# Patient Record
Sex: FEMALE | Race: WHITE | NOT HISPANIC OR LATINO | Employment: OTHER | URBAN - METROPOLITAN AREA
[De-identification: names, ages, dates, MRNs, and addresses within clinical notes are randomized per-mention and may not be internally consistent; named-entity substitution may affect disease eponyms.]

---

## 2017-04-12 ENCOUNTER — APPOINTMENT (OUTPATIENT)
Dept: LAB | Facility: OTHER | Age: 62
End: 2017-04-12
Payer: COMMERCIAL

## 2017-04-12 ENCOUNTER — TRANSCRIBE ORDERS (OUTPATIENT)
Dept: LAB | Facility: OTHER | Age: 62
End: 2017-04-12

## 2017-04-12 DIAGNOSIS — E03.9 HYPOTHYROIDISM: ICD-10-CM

## 2017-04-12 DIAGNOSIS — J45.30 MILD PERSISTENT ASTHMA, UNCOMPLICATED: ICD-10-CM

## 2017-04-12 DIAGNOSIS — E78.5 HYPERLIPIDEMIA: ICD-10-CM

## 2017-04-12 LAB
ALBUMIN SERPL BCP-MCNC: 3.3 G/DL (ref 3.5–5)
ALP SERPL-CCNC: 64 U/L (ref 46–116)
ALT SERPL W P-5'-P-CCNC: 33 U/L (ref 12–78)
ANION GAP SERPL CALCULATED.3IONS-SCNC: 9 MMOL/L (ref 4–13)
AST SERPL W P-5'-P-CCNC: 23 U/L (ref 5–45)
BASOPHILS # BLD AUTO: 0.02 THOUSANDS/ΜL (ref 0–0.1)
BASOPHILS NFR BLD AUTO: 0 % (ref 0–1)
BILIRUB SERPL-MCNC: 0.46 MG/DL (ref 0.2–1)
BUN SERPL-MCNC: 21 MG/DL (ref 5–25)
CALCIUM SERPL-MCNC: 8.3 MG/DL (ref 8.3–10.1)
CHLORIDE SERPL-SCNC: 106 MMOL/L (ref 100–108)
CHOLEST SERPL-MCNC: 185 MG/DL (ref 50–200)
CO2 SERPL-SCNC: 27 MMOL/L (ref 21–32)
CREAT SERPL-MCNC: 0.71 MG/DL (ref 0.6–1.3)
EOSINOPHIL # BLD AUTO: 0.01 THOUSAND/ΜL (ref 0–0.61)
EOSINOPHIL NFR BLD AUTO: 0 % (ref 0–6)
ERYTHROCYTE [DISTWIDTH] IN BLOOD BY AUTOMATED COUNT: 13.3 % (ref 11.6–15.1)
GFR SERPL CREATININE-BSD FRML MDRD: >60 ML/MIN/1.73SQ M
GLUCOSE P FAST SERPL-MCNC: 90 MG/DL (ref 65–99)
HCT VFR BLD AUTO: 40.3 % (ref 34.8–46.1)
HDLC SERPL-MCNC: 95 MG/DL (ref 40–60)
HGB BLD-MCNC: 13.2 G/DL (ref 11.5–15.4)
LDLC SERPL CALC-MCNC: 80 MG/DL (ref 0–100)
LYMPHOCYTES # BLD AUTO: 0.94 THOUSANDS/ΜL (ref 0.6–4.47)
LYMPHOCYTES NFR BLD AUTO: 20 % (ref 14–44)
MCH RBC QN AUTO: 32 PG (ref 26.8–34.3)
MCHC RBC AUTO-ENTMCNC: 32.8 G/DL (ref 31.4–37.4)
MCV RBC AUTO: 98 FL (ref 82–98)
MONOCYTES # BLD AUTO: 0.61 THOUSAND/ΜL (ref 0.17–1.22)
MONOCYTES NFR BLD AUTO: 13 % (ref 4–12)
NEUTROPHILS # BLD AUTO: 3.21 THOUSANDS/ΜL (ref 1.85–7.62)
NEUTS SEG NFR BLD AUTO: 67 % (ref 43–75)
NRBC BLD AUTO-RTO: 0 /100 WBCS
PLATELET # BLD AUTO: 243 THOUSANDS/UL (ref 149–390)
PMV BLD AUTO: 10 FL (ref 8.9–12.7)
POTASSIUM SERPL-SCNC: 3.3 MMOL/L (ref 3.5–5.3)
PROT SERPL-MCNC: 6.5 G/DL (ref 6.4–8.2)
RBC # BLD AUTO: 4.12 MILLION/UL (ref 3.81–5.12)
SODIUM SERPL-SCNC: 142 MMOL/L (ref 136–145)
TRIGL SERPL-MCNC: 48 MG/DL
TSH SERPL DL<=0.05 MIU/L-ACNC: 0.43 UIU/ML (ref 0.36–3.74)
WBC # BLD AUTO: 4.8 THOUSAND/UL (ref 4.31–10.16)

## 2017-04-12 PROCEDURE — 84443 ASSAY THYROID STIM HORMONE: CPT

## 2017-04-12 PROCEDURE — 85025 COMPLETE CBC W/AUTO DIFF WBC: CPT

## 2017-04-12 PROCEDURE — 80061 LIPID PANEL: CPT

## 2017-04-12 PROCEDURE — 36415 COLL VENOUS BLD VENIPUNCTURE: CPT

## 2017-04-12 PROCEDURE — 80053 COMPREHEN METABOLIC PANEL: CPT

## 2017-04-25 ENCOUNTER — ALLSCRIPTS OFFICE VISIT (OUTPATIENT)
Dept: OTHER | Facility: OTHER | Age: 62
End: 2017-04-25

## 2017-06-22 ENCOUNTER — ALLSCRIPTS OFFICE VISIT (OUTPATIENT)
Dept: OTHER | Facility: OTHER | Age: 62
End: 2017-06-22

## 2017-08-25 DIAGNOSIS — E03.9 HYPOTHYROIDISM: ICD-10-CM

## 2017-08-25 DIAGNOSIS — S42.125D: ICD-10-CM

## 2017-08-25 DIAGNOSIS — E78.5 HYPERLIPIDEMIA: ICD-10-CM

## 2017-08-26 ENCOUNTER — APPOINTMENT (EMERGENCY)
Dept: CT IMAGING | Facility: HOSPITAL | Age: 62
End: 2017-08-26
Payer: COMMERCIAL

## 2017-08-26 ENCOUNTER — APPOINTMENT (EMERGENCY)
Dept: RADIOLOGY | Facility: HOSPITAL | Age: 62
End: 2017-08-26
Payer: COMMERCIAL

## 2017-08-26 ENCOUNTER — HOSPITAL ENCOUNTER (EMERGENCY)
Facility: HOSPITAL | Age: 62
End: 2017-08-27
Attending: EMERGENCY MEDICINE | Admitting: EMERGENCY MEDICINE
Payer: COMMERCIAL

## 2017-08-26 VITALS
HEART RATE: 82 BPM | DIASTOLIC BLOOD PRESSURE: 66 MMHG | TEMPERATURE: 98.3 F | HEIGHT: 63 IN | WEIGHT: 113.1 LBS | SYSTOLIC BLOOD PRESSURE: 128 MMHG | OXYGEN SATURATION: 96 % | BODY MASS INDEX: 20.04 KG/M2 | RESPIRATION RATE: 18 BRPM

## 2017-08-26 DIAGNOSIS — S02.0XXA: ICD-10-CM

## 2017-08-26 DIAGNOSIS — I62.00 SUBDURAL HEMORRHAGE (HCC): Primary | ICD-10-CM

## 2017-08-26 DIAGNOSIS — S22.5XXA FLAIL CHEST: ICD-10-CM

## 2017-08-26 DIAGNOSIS — S01.01XA SCALP LACERATION, INITIAL ENCOUNTER: ICD-10-CM

## 2017-08-26 DIAGNOSIS — J94.2 HEMOTHORAX, LEFT: ICD-10-CM

## 2017-08-26 DIAGNOSIS — S05.12XA TRAUMATIC PERIORBITAL ECCHYMOSIS OF LEFT EYE, INITIAL ENCOUNTER: ICD-10-CM

## 2017-08-26 DIAGNOSIS — I60.9 SUBARACHNOID HEMORRHAGE (HCC): ICD-10-CM

## 2017-08-26 LAB
ALBUMIN SERPL BCP-MCNC: 3.5 G/DL (ref 3.5–5)
ALP SERPL-CCNC: 68 U/L (ref 46–116)
ALT SERPL W P-5'-P-CCNC: 34 U/L (ref 12–78)
ANION GAP SERPL CALCULATED.3IONS-SCNC: 8 MMOL/L (ref 4–13)
AST SERPL W P-5'-P-CCNC: 34 U/L (ref 5–45)
BASOPHILS # BLD AUTO: 0.05 THOUSANDS/ΜL (ref 0–0.1)
BASOPHILS NFR BLD AUTO: 0 % (ref 0–1)
BILIRUB SERPL-MCNC: 0.4 MG/DL (ref 0.2–1)
BUN SERPL-MCNC: 19 MG/DL (ref 5–25)
CALCIUM SERPL-MCNC: 8.5 MG/DL (ref 8.3–10.1)
CHLORIDE SERPL-SCNC: 105 MMOL/L (ref 100–108)
CO2 SERPL-SCNC: 28 MMOL/L (ref 21–32)
CREAT SERPL-MCNC: 0.78 MG/DL (ref 0.6–1.3)
EOSINOPHIL # BLD AUTO: 0.03 THOUSAND/ΜL (ref 0–0.61)
EOSINOPHIL NFR BLD AUTO: 0 % (ref 0–6)
ERYTHROCYTE [DISTWIDTH] IN BLOOD BY AUTOMATED COUNT: 12.5 % (ref 11.6–15.1)
GFR SERPL CREATININE-BSD FRML MDRD: 81 ML/MIN/1.73SQ M
GLUCOSE SERPL-MCNC: 187 MG/DL (ref 65–140)
HCT VFR BLD AUTO: 37.7 % (ref 34.8–46.1)
HGB BLD-MCNC: 12.5 G/DL (ref 11.5–15.4)
LYMPHOCYTES # BLD AUTO: 1.15 THOUSANDS/ΜL (ref 0.6–4.47)
LYMPHOCYTES NFR BLD AUTO: 7 % (ref 14–44)
MCH RBC QN AUTO: 31.7 PG (ref 26.8–34.3)
MCHC RBC AUTO-ENTMCNC: 33.2 G/DL (ref 31.4–37.4)
MCV RBC AUTO: 96 FL (ref 82–98)
MONOCYTES # BLD AUTO: 0.85 THOUSAND/ΜL (ref 0.17–1.22)
MONOCYTES NFR BLD AUTO: 5 % (ref 4–12)
NEUTROPHILS # BLD AUTO: 15.02 THOUSANDS/ΜL (ref 1.85–7.62)
NEUTS SEG NFR BLD AUTO: 87 % (ref 43–75)
NRBC BLD AUTO-RTO: 0 /100 WBCS
PLATELET # BLD AUTO: 266 THOUSANDS/UL (ref 149–390)
PMV BLD AUTO: 9.3 FL (ref 8.9–12.7)
POTASSIUM SERPL-SCNC: 3.1 MMOL/L (ref 3.5–5.3)
PROT SERPL-MCNC: 6.4 G/DL (ref 6.4–8.2)
RBC # BLD AUTO: 3.94 MILLION/UL (ref 3.81–5.12)
SODIUM SERPL-SCNC: 141 MMOL/L (ref 136–145)
TROPONIN I SERPL-MCNC: <0.02 NG/ML
WBC # BLD AUTO: 17.21 THOUSAND/UL (ref 4.31–10.16)

## 2017-08-26 PROCEDURE — 70486 CT MAXILLOFACIAL W/O DYE: CPT

## 2017-08-26 PROCEDURE — 80053 COMPREHEN METABOLIC PANEL: CPT | Performed by: EMERGENCY MEDICINE

## 2017-08-26 PROCEDURE — 36415 COLL VENOUS BLD VENIPUNCTURE: CPT | Performed by: EMERGENCY MEDICINE

## 2017-08-26 PROCEDURE — 74177 CT ABD & PELVIS W/CONTRAST: CPT

## 2017-08-26 PROCEDURE — 71260 CT THORAX DX C+: CPT

## 2017-08-26 PROCEDURE — 96360 HYDRATION IV INFUSION INIT: CPT

## 2017-08-26 PROCEDURE — 93005 ELECTROCARDIOGRAM TRACING: CPT | Performed by: EMERGENCY MEDICINE

## 2017-08-26 PROCEDURE — 84484 ASSAY OF TROPONIN QUANT: CPT | Performed by: EMERGENCY MEDICINE

## 2017-08-26 PROCEDURE — 73030 X-RAY EXAM OF SHOULDER: CPT

## 2017-08-26 PROCEDURE — 70450 CT HEAD/BRAIN W/O DYE: CPT

## 2017-08-26 PROCEDURE — 71010 HB CHEST X-RAY 1 VIEW FRONTAL (PORTABLE): CPT

## 2017-08-26 PROCEDURE — 72125 CT NECK SPINE W/O DYE: CPT

## 2017-08-26 PROCEDURE — 85025 COMPLETE CBC W/AUTO DIFF WBC: CPT | Performed by: EMERGENCY MEDICINE

## 2017-08-26 PROCEDURE — 96361 HYDRATE IV INFUSION ADD-ON: CPT

## 2017-08-26 RX ORDER — LEVOTHYROXINE SODIUM 0.07 MG/1
75 TABLET ORAL DAILY
COMMUNITY
End: 2018-05-18 | Stop reason: SDUPTHER

## 2017-08-26 RX ORDER — FLUTICASONE PROPIONATE 44 UG/1
2 AEROSOL, METERED RESPIRATORY (INHALATION) 2 TIMES DAILY
COMMUNITY
End: 2018-05-22 | Stop reason: SDUPTHER

## 2017-08-26 RX ORDER — MODAFINIL 200 MG/1
200 TABLET ORAL DAILY
COMMUNITY
Start: 2015-12-08 | End: 2018-03-16

## 2017-08-26 RX ORDER — ASPIRIN 81 MG/1
81 TABLET ORAL DAILY
COMMUNITY
End: 2017-09-05 | Stop reason: HOSPADM

## 2017-08-26 RX ADMIN — SODIUM CHLORIDE 1000 ML: 0.9 INJECTION, SOLUTION INTRAVENOUS at 22:02

## 2017-08-26 RX ADMIN — IOHEXOL 100 ML: 350 INJECTION, SOLUTION INTRAVENOUS at 23:14

## 2017-08-27 ENCOUNTER — APPOINTMENT (INPATIENT)
Dept: RADIOLOGY | Facility: HOSPITAL | Age: 62
DRG: 930 | End: 2017-08-27
Payer: COMMERCIAL

## 2017-08-27 ENCOUNTER — HOSPITAL ENCOUNTER (INPATIENT)
Facility: HOSPITAL | Age: 62
LOS: 9 days | Discharge: NURSING, REHAB OR LONG-TERM CARE | DRG: 930 | End: 2017-09-05
Attending: SURGERY | Admitting: SURGERY
Payer: COMMERCIAL

## 2017-08-27 DIAGNOSIS — S06.5X9A SUBDURAL HEMATOMA, ACUTE (HCC): Primary | ICD-10-CM

## 2017-08-27 DIAGNOSIS — S06.5X9A SDH (SUBDURAL HEMATOMA) (HCC): ICD-10-CM

## 2017-08-27 DIAGNOSIS — M25.512 LEFT SHOULDER PAIN: ICD-10-CM

## 2017-08-27 DIAGNOSIS — I60.9 SAH (SUBARACHNOID HEMORRHAGE) (HCC): ICD-10-CM

## 2017-08-27 DIAGNOSIS — S02.19XA: ICD-10-CM

## 2017-08-27 DIAGNOSIS — S22.42XA CLOSED FRACTURE OF MULTIPLE RIBS OF LEFT SIDE: ICD-10-CM

## 2017-08-27 PROBLEM — S02.91XA SKULL FRACTURE (HCC): Status: ACTIVE | Noted: 2017-08-27

## 2017-08-27 PROBLEM — S27.0XXA TRAUMATIC PNEUMOTHORAX: Status: ACTIVE | Noted: 2017-08-27

## 2017-08-27 LAB
ABO GROUP BLD: NORMAL
ANION GAP SERPL CALCULATED.3IONS-SCNC: 8 MMOL/L (ref 4–13)
APTT PPP: 23 SECONDS (ref 23–35)
ATRIAL RATE: 53 BPM
BASOPHILS # BLD AUTO: 0.02 THOUSANDS/ΜL (ref 0–0.1)
BASOPHILS # BLD AUTO: 0.02 THOUSANDS/ΜL (ref 0–0.1)
BASOPHILS NFR BLD AUTO: 0 % (ref 0–1)
BASOPHILS NFR BLD AUTO: 0 % (ref 0–1)
BLD GP AB SCN SERPL QL: NEGATIVE
BUN SERPL-MCNC: 14 MG/DL (ref 5–25)
CA-I BLD-SCNC: 1.09 MMOL/L (ref 1.12–1.32)
CALCIUM SERPL-MCNC: 7.9 MG/DL (ref 8.3–10.1)
CHLORIDE SERPL-SCNC: 111 MMOL/L (ref 100–108)
CO2 SERPL-SCNC: 25 MMOL/L (ref 21–32)
CREAT SERPL-MCNC: 0.49 MG/DL (ref 0.6–1.3)
EOSINOPHIL # BLD AUTO: 0.02 THOUSAND/ΜL (ref 0–0.61)
EOSINOPHIL # BLD AUTO: 0.08 THOUSAND/ΜL (ref 0–0.61)
EOSINOPHIL NFR BLD AUTO: 0 % (ref 0–6)
EOSINOPHIL NFR BLD AUTO: 1 % (ref 0–6)
ERYTHROCYTE [DISTWIDTH] IN BLOOD BY AUTOMATED COUNT: 13 % (ref 11.6–15.1)
ERYTHROCYTE [DISTWIDTH] IN BLOOD BY AUTOMATED COUNT: 13 % (ref 11.6–15.1)
GFR SERPL CREATININE-BSD FRML MDRD: 103 ML/MIN/1.73SQ M
GLUCOSE SERPL-MCNC: 98 MG/DL (ref 65–140)
HCT VFR BLD AUTO: 31.6 % (ref 34.8–46.1)
HCT VFR BLD AUTO: 34 % (ref 34.8–46.1)
HGB BLD-MCNC: 10.3 G/DL (ref 11.5–15.4)
HGB BLD-MCNC: 11.3 G/DL (ref 11.5–15.4)
INR PPP: 0.98 (ref 0.86–1.16)
LYMPHOCYTES # BLD AUTO: 1.2 THOUSANDS/ΜL (ref 0.6–4.47)
LYMPHOCYTES # BLD AUTO: 1.21 THOUSANDS/ΜL (ref 0.6–4.47)
LYMPHOCYTES NFR BLD AUTO: 13 % (ref 14–44)
LYMPHOCYTES NFR BLD AUTO: 20 % (ref 14–44)
MAGNESIUM SERPL-MCNC: 2.2 MG/DL (ref 1.6–2.6)
MCH RBC QN AUTO: 31.8 PG (ref 26.8–34.3)
MCH RBC QN AUTO: 32 PG (ref 26.8–34.3)
MCHC RBC AUTO-ENTMCNC: 32.6 G/DL (ref 31.4–37.4)
MCHC RBC AUTO-ENTMCNC: 33.2 G/DL (ref 31.4–37.4)
MCV RBC AUTO: 96 FL (ref 82–98)
MCV RBC AUTO: 98 FL (ref 82–98)
MONOCYTES # BLD AUTO: 0.66 THOUSAND/ΜL (ref 0.17–1.22)
MONOCYTES # BLD AUTO: 0.77 THOUSAND/ΜL (ref 0.17–1.22)
MONOCYTES NFR BLD AUTO: 11 % (ref 4–12)
MONOCYTES NFR BLD AUTO: 8 % (ref 4–12)
NEUTROPHILS # BLD AUTO: 4.22 THOUSANDS/ΜL (ref 1.85–7.62)
NEUTROPHILS # BLD AUTO: 7.22 THOUSANDS/ΜL (ref 1.85–7.62)
NEUTS SEG NFR BLD AUTO: 68 % (ref 43–75)
NEUTS SEG NFR BLD AUTO: 79 % (ref 43–75)
NRBC BLD AUTO-RTO: 0 /100 WBCS
NRBC BLD AUTO-RTO: 0 /100 WBCS
P AXIS: 66 DEGREES
PHOSPHATE SERPL-MCNC: 3.2 MG/DL (ref 2.3–4.1)
PLATELET # BLD AUTO: 209 THOUSANDS/UL (ref 149–390)
PLATELET # BLD AUTO: 240 THOUSANDS/UL (ref 149–390)
PMV BLD AUTO: 9.1 FL (ref 8.9–12.7)
PMV BLD AUTO: 9.5 FL (ref 8.9–12.7)
POTASSIUM SERPL-SCNC: 3.3 MMOL/L (ref 3.5–5.3)
PR INTERVAL: 134 MS
PROTHROMBIN TIME: 13 SECONDS (ref 12.1–14.4)
QRS AXIS: 71 DEGREES
QRSD INTERVAL: 90 MS
QT INTERVAL: 480 MS
QTC INTERVAL: 450 MS
RBC # BLD AUTO: 3.24 MILLION/UL (ref 3.81–5.12)
RBC # BLD AUTO: 3.53 MILLION/UL (ref 3.81–5.12)
RH BLD: POSITIVE
SODIUM SERPL-SCNC: 144 MMOL/L (ref 136–145)
SPECIMEN EXPIRATION DATE: NORMAL
T WAVE AXIS: 63 DEGREES
VENTRICULAR RATE: 53 BPM
WBC # BLD AUTO: 6.2 THOUSAND/UL (ref 4.31–10.16)
WBC # BLD AUTO: 9.26 THOUSAND/UL (ref 4.31–10.16)

## 2017-08-27 PROCEDURE — 86901 BLOOD TYPING SEROLOGIC RH(D): CPT | Performed by: SURGERY

## 2017-08-27 PROCEDURE — 86850 RBC ANTIBODY SCREEN: CPT | Performed by: SURGERY

## 2017-08-27 PROCEDURE — 80048 BASIC METABOLIC PNL TOTAL CA: CPT | Performed by: EMERGENCY MEDICINE

## 2017-08-27 PROCEDURE — 85025 COMPLETE CBC W/AUTO DIFF WBC: CPT | Performed by: SURGERY

## 2017-08-27 PROCEDURE — 83735 ASSAY OF MAGNESIUM: CPT | Performed by: EMERGENCY MEDICINE

## 2017-08-27 PROCEDURE — 84100 ASSAY OF PHOSPHORUS: CPT | Performed by: EMERGENCY MEDICINE

## 2017-08-27 PROCEDURE — 85025 COMPLETE CBC W/AUTO DIFF WBC: CPT | Performed by: EMERGENCY MEDICINE

## 2017-08-27 PROCEDURE — 82330 ASSAY OF CALCIUM: CPT | Performed by: EMERGENCY MEDICINE

## 2017-08-27 PROCEDURE — 71010 HB CHEST X-RAY 1 VIEW FRONTAL (PORTABLE): CPT

## 2017-08-27 PROCEDURE — 86900 BLOOD TYPING SEROLOGIC ABO: CPT | Performed by: SURGERY

## 2017-08-27 PROCEDURE — 85730 THROMBOPLASTIN TIME PARTIAL: CPT | Performed by: EMERGENCY MEDICINE

## 2017-08-27 PROCEDURE — 99285 EMERGENCY DEPT VISIT HI MDM: CPT

## 2017-08-27 PROCEDURE — 93005 ELECTROCARDIOGRAM TRACING: CPT | Performed by: EMERGENCY MEDICINE

## 2017-08-27 PROCEDURE — 71010 HB CHEST X-RAY 1 VIEW FRONTAL: CPT

## 2017-08-27 PROCEDURE — 99284 EMERGENCY DEPT VISIT MOD MDM: CPT

## 2017-08-27 PROCEDURE — 85610 PROTHROMBIN TIME: CPT | Performed by: EMERGENCY MEDICINE

## 2017-08-27 RX ORDER — OXYCODONE HYDROCHLORIDE 10 MG/1
10 TABLET ORAL EVERY 4 HOURS PRN
Status: DISCONTINUED | OUTPATIENT
Start: 2017-08-27 | End: 2017-08-28

## 2017-08-27 RX ORDER — POTASSIUM CHLORIDE 14.9 MG/ML
20 INJECTION INTRAVENOUS ONCE
Status: COMPLETED | OUTPATIENT
Start: 2017-08-27 | End: 2017-08-27

## 2017-08-27 RX ORDER — SODIUM CHLORIDE, SODIUM GLUCONATE, SODIUM ACETATE, POTASSIUM CHLORIDE, MAGNESIUM CHLORIDE, SODIUM PHOSPHATE, DIBASIC, AND POTASSIUM PHOSPHATE .53; .5; .37; .037; .03; .012; .00082 G/100ML; G/100ML; G/100ML; G/100ML; G/100ML; G/100ML; G/100ML
1000 INJECTION, SOLUTION INTRAVENOUS ONCE
Status: COMPLETED | OUTPATIENT
Start: 2017-08-27 | End: 2017-08-27

## 2017-08-27 RX ORDER — SODIUM CHLORIDE, SODIUM GLUCONATE, SODIUM ACETATE, POTASSIUM CHLORIDE, MAGNESIUM CHLORIDE, SODIUM PHOSPHATE, DIBASIC, AND POTASSIUM PHOSPHATE .53; .5; .37; .037; .03; .012; .00082 G/100ML; G/100ML; G/100ML; G/100ML; G/100ML; G/100ML; G/100ML
125 INJECTION, SOLUTION INTRAVENOUS CONTINUOUS
Status: DISCONTINUED | OUTPATIENT
Start: 2017-08-27 | End: 2017-08-27

## 2017-08-27 RX ORDER — OXYCODONE HYDROCHLORIDE 5 MG/1
5 TABLET ORAL EVERY 4 HOURS PRN
Status: DISCONTINUED | OUTPATIENT
Start: 2017-08-27 | End: 2017-08-28

## 2017-08-27 RX ORDER — LEVETIRACETAM 500 MG/1
500 TABLET ORAL EVERY 12 HOURS SCHEDULED
Status: COMPLETED | OUTPATIENT
Start: 2017-08-27 | End: 2017-09-03

## 2017-08-27 RX ORDER — METHOCARBAMOL 500 MG/1
500 TABLET, FILM COATED ORAL EVERY 6 HOURS SCHEDULED
Status: DISCONTINUED | OUTPATIENT
Start: 2017-08-27 | End: 2017-09-05 | Stop reason: HOSPADM

## 2017-08-27 RX ORDER — LEVOTHYROXINE SODIUM 0.07 MG/1
75 TABLET ORAL
Status: DISCONTINUED | OUTPATIENT
Start: 2017-08-27 | End: 2017-09-05 | Stop reason: HOSPADM

## 2017-08-27 RX ORDER — ACETAMINOPHEN 325 MG/1
975 TABLET ORAL EVERY 6 HOURS SCHEDULED
Status: DISCONTINUED | OUTPATIENT
Start: 2017-08-27 | End: 2017-09-03

## 2017-08-27 RX ORDER — FLUTICASONE PROPIONATE 44 UG/1
2 AEROSOL, METERED RESPIRATORY (INHALATION) 2 TIMES DAILY
Status: DISCONTINUED | OUTPATIENT
Start: 2017-08-27 | End: 2017-09-05 | Stop reason: HOSPADM

## 2017-08-27 RX ORDER — DIAZEPAM 2 MG/1
2 TABLET ORAL EVERY 6 HOURS PRN
Status: DISCONTINUED | OUTPATIENT
Start: 2017-08-27 | End: 2017-08-27

## 2017-08-27 RX ORDER — LIDOCAINE 50 MG/G
1 PATCH TOPICAL DAILY
Status: DISCONTINUED | OUTPATIENT
Start: 2017-08-27 | End: 2017-09-05 | Stop reason: HOSPADM

## 2017-08-27 RX ADMIN — LEVOTHYROXINE SODIUM 75 MCG: 75 TABLET ORAL at 10:02

## 2017-08-27 RX ADMIN — METHOCARBAMOL 500 MG: 500 TABLET ORAL at 12:00

## 2017-08-27 RX ADMIN — LEVETIRACETAM 500 MG: 500 TABLET ORAL at 10:04

## 2017-08-27 RX ADMIN — HYDROMORPHONE HYDROCHLORIDE 0.5 MG: 1 INJECTION, SOLUTION INTRAMUSCULAR; INTRAVENOUS; SUBCUTANEOUS at 05:43

## 2017-08-27 RX ADMIN — OXYCODONE HYDROCHLORIDE 5 MG: 5 TABLET ORAL at 10:48

## 2017-08-27 RX ADMIN — ACETAMINOPHEN 975 MG: 325 TABLET, FILM COATED ORAL at 11:59

## 2017-08-27 RX ADMIN — METHOCARBAMOL 500 MG: 500 TABLET ORAL at 18:29

## 2017-08-27 RX ADMIN — POTASSIUM CHLORIDE 20 MEQ: 200 INJECTION, SOLUTION INTRAVENOUS at 06:23

## 2017-08-27 RX ADMIN — CEFAZOLIN SODIUM 1000 MG: 1 SOLUTION INTRAVENOUS at 04:03

## 2017-08-27 RX ADMIN — SODIUM CHLORIDE, SODIUM GLUCONATE, SODIUM ACETATE, POTASSIUM CHLORIDE, MAGNESIUM CHLORIDE, SODIUM PHOSPHATE, DIBASIC, AND POTASSIUM PHOSPHATE 1000 ML: .53; .5; .37; .037; .03; .012; .00082 INJECTION, SOLUTION INTRAVENOUS at 18:26

## 2017-08-27 RX ADMIN — ACETAMINOPHEN 975 MG: 325 TABLET, FILM COATED ORAL at 18:29

## 2017-08-27 RX ADMIN — CALCIUM GLUCONATE 1 G: 94 INJECTION, SOLUTION INTRAVENOUS at 08:20

## 2017-08-27 RX ADMIN — LIDOCAINE 1 PATCH: 50 PATCH CUTANEOUS at 12:00

## 2017-08-27 RX ADMIN — OXYCODONE HYDROCHLORIDE 5 MG: 5 TABLET ORAL at 20:04

## 2017-08-27 RX ADMIN — POTASSIUM CHLORIDE 20 MEQ: 200 INJECTION, SOLUTION INTRAVENOUS at 11:54

## 2017-08-27 RX ADMIN — LEVETIRACETAM 500 MG: 500 TABLET ORAL at 20:00

## 2017-08-27 RX ADMIN — FLUTICASONE PROPIONATE 2 PUFF: 44 AEROSOL, METERED RESPIRATORY (INHALATION) at 11:54

## 2017-08-27 RX ADMIN — SODIUM CHLORIDE, SODIUM GLUCONATE, SODIUM ACETATE, POTASSIUM CHLORIDE, MAGNESIUM CHLORIDE, SODIUM PHOSPHATE, DIBASIC, AND POTASSIUM PHOSPHATE 1000 ML: .53; .5; .37; .037; .03; .012; .00082 INJECTION, SOLUTION INTRAVENOUS at 21:06

## 2017-08-27 RX ADMIN — FLUTICASONE PROPIONATE 2 PUFF: 44 AEROSOL, METERED RESPIRATORY (INHALATION) at 18:30

## 2017-08-27 RX ADMIN — POTASSIUM CHLORIDE 20 MEQ: 200 INJECTION, SOLUTION INTRAVENOUS at 08:20

## 2017-08-28 ENCOUNTER — APPOINTMENT (INPATIENT)
Dept: RADIOLOGY | Facility: HOSPITAL | Age: 62
DRG: 930 | End: 2017-08-28
Payer: COMMERCIAL

## 2017-08-28 PROBLEM — G47.33 OSA (OBSTRUCTIVE SLEEP APNEA): Status: ACTIVE | Noted: 2017-08-28

## 2017-08-28 PROBLEM — S02.40FA CLOSED FRACTURE OF LEFT ZYGOMATIC ARCH (HCC): Status: ACTIVE | Noted: 2017-08-28

## 2017-08-28 PROBLEM — S42.125A CLOSED NONDISPLACED FRACTURE OF LEFT ACROMIAL PROCESS: Status: ACTIVE | Noted: 2017-08-28

## 2017-08-28 LAB
ANION GAP SERPL CALCULATED.3IONS-SCNC: 5 MMOL/L (ref 4–13)
ATRIAL RATE: 88 BPM
BASOPHILS # BLD AUTO: 0.02 THOUSANDS/ΜL (ref 0–0.1)
BASOPHILS NFR BLD AUTO: 0 % (ref 0–1)
BUN SERPL-MCNC: 13 MG/DL (ref 5–25)
CALCIUM SERPL-MCNC: 8.1 MG/DL (ref 8.3–10.1)
CHLORIDE SERPL-SCNC: 109 MMOL/L (ref 100–108)
CO2 SERPL-SCNC: 28 MMOL/L (ref 21–32)
CREAT SERPL-MCNC: 0.67 MG/DL (ref 0.6–1.3)
EOSINOPHIL # BLD AUTO: 0.12 THOUSAND/ΜL (ref 0–0.61)
EOSINOPHIL NFR BLD AUTO: 2 % (ref 0–6)
ERYTHROCYTE [DISTWIDTH] IN BLOOD BY AUTOMATED COUNT: 13.1 % (ref 11.6–15.1)
GFR SERPL CREATININE-BSD FRML MDRD: 93 ML/MIN/1.73SQ M
GLUCOSE SERPL-MCNC: 114 MG/DL (ref 65–140)
HCT VFR BLD AUTO: 35.3 % (ref 34.8–46.1)
HGB BLD-MCNC: 11.5 G/DL (ref 11.5–15.4)
LYMPHOCYTES # BLD AUTO: 1.59 THOUSANDS/ΜL (ref 0.6–4.47)
LYMPHOCYTES NFR BLD AUTO: 24 % (ref 14–44)
MCH RBC QN AUTO: 32.2 PG (ref 26.8–34.3)
MCHC RBC AUTO-ENTMCNC: 32.6 G/DL (ref 31.4–37.4)
MCV RBC AUTO: 99 FL (ref 82–98)
MONOCYTES # BLD AUTO: 0.59 THOUSAND/ΜL (ref 0.17–1.22)
MONOCYTES NFR BLD AUTO: 9 % (ref 4–12)
NEUTROPHILS # BLD AUTO: 4.39 THOUSANDS/ΜL (ref 1.85–7.62)
NEUTS SEG NFR BLD AUTO: 65 % (ref 43–75)
NRBC BLD AUTO-RTO: 0 /100 WBCS
P AXIS: 75 DEGREES
PLATELET # BLD AUTO: 221 THOUSANDS/UL (ref 149–390)
PMV BLD AUTO: 9.4 FL (ref 8.9–12.7)
POTASSIUM SERPL-SCNC: 3.7 MMOL/L (ref 3.5–5.3)
PR INTERVAL: 148 MS
QRS AXIS: 75 DEGREES
QRSD INTERVAL: 92 MS
QT INTERVAL: 396 MS
QTC INTERVAL: 479 MS
RBC # BLD AUTO: 3.57 MILLION/UL (ref 3.81–5.12)
SODIUM SERPL-SCNC: 142 MMOL/L (ref 136–145)
T WAVE AXIS: 63 DEGREES
VENTRICULAR RATE: 88 BPM
WBC # BLD AUTO: 6.72 THOUSAND/UL (ref 4.31–10.16)

## 2017-08-28 PROCEDURE — 94660 CPAP INITIATION&MGMT: CPT

## 2017-08-28 PROCEDURE — 85025 COMPLETE CBC W/AUTO DIFF WBC: CPT | Performed by: NURSE PRACTITIONER

## 2017-08-28 PROCEDURE — 94760 N-INVAS EAR/PLS OXIMETRY 1: CPT

## 2017-08-28 PROCEDURE — 71010 HB CHEST X-RAY 1 VIEW FRONTAL (PORTABLE): CPT

## 2017-08-28 PROCEDURE — 70450 CT HEAD/BRAIN W/O DYE: CPT

## 2017-08-28 PROCEDURE — 80048 BASIC METABOLIC PNL TOTAL CA: CPT | Performed by: NURSE PRACTITIONER

## 2017-08-28 RX ORDER — DOCUSATE SODIUM 100 MG/1
100 CAPSULE, LIQUID FILLED ORAL 2 TIMES DAILY
Status: DISCONTINUED | OUTPATIENT
Start: 2017-08-28 | End: 2017-09-05 | Stop reason: HOSPADM

## 2017-08-28 RX ORDER — OXYCODONE HYDROCHLORIDE 5 MG/1
5 TABLET ORAL EVERY 4 HOURS PRN
Status: DISCONTINUED | OUTPATIENT
Start: 2017-08-28 | End: 2017-09-05 | Stop reason: HOSPADM

## 2017-08-28 RX ORDER — MODAFINIL 100 MG/1
200 TABLET ORAL DAILY
Status: DISCONTINUED | OUTPATIENT
Start: 2017-08-28 | End: 2017-09-05 | Stop reason: HOSPADM

## 2017-08-28 RX ORDER — OXYCODONE HYDROCHLORIDE 5 MG/1
2.5 TABLET ORAL EVERY 4 HOURS PRN
Status: DISCONTINUED | OUTPATIENT
Start: 2017-08-28 | End: 2017-09-05 | Stop reason: HOSPADM

## 2017-08-28 RX ORDER — SENNOSIDES 8.6 MG
1 TABLET ORAL
Status: DISCONTINUED | OUTPATIENT
Start: 2017-08-28 | End: 2017-09-05 | Stop reason: HOSPADM

## 2017-08-28 RX ORDER — POTASSIUM CHLORIDE 20 MEQ/1
20 TABLET, EXTENDED RELEASE ORAL ONCE
Status: COMPLETED | OUTPATIENT
Start: 2017-08-28 | End: 2017-08-28

## 2017-08-28 RX ADMIN — ACETAMINOPHEN 975 MG: 325 TABLET, FILM COATED ORAL at 11:34

## 2017-08-28 RX ADMIN — SENNOSIDES 8.6 MG: 8.6 TABLET, FILM COATED ORAL at 21:32

## 2017-08-28 RX ADMIN — LIDOCAINE 1 PATCH: 50 PATCH CUTANEOUS at 10:30

## 2017-08-28 RX ADMIN — ACETAMINOPHEN 975 MG: 325 TABLET, FILM COATED ORAL at 17:20

## 2017-08-28 RX ADMIN — LEVETIRACETAM 500 MG: 500 TABLET ORAL at 08:41

## 2017-08-28 RX ADMIN — ACETAMINOPHEN 975 MG: 325 TABLET, FILM COATED ORAL at 00:02

## 2017-08-28 RX ADMIN — OXYCODONE HYDROCHLORIDE 2.5 MG: 5 TABLET ORAL at 15:16

## 2017-08-28 RX ADMIN — OXYCODONE HYDROCHLORIDE 2.5 MG: 5 TABLET ORAL at 16:52

## 2017-08-28 RX ADMIN — DOCUSATE SODIUM 100 MG: 100 CAPSULE, LIQUID FILLED ORAL at 17:20

## 2017-08-28 RX ADMIN — METHOCARBAMOL 500 MG: 500 TABLET ORAL at 06:57

## 2017-08-28 RX ADMIN — METHOCARBAMOL 500 MG: 500 TABLET ORAL at 23:26

## 2017-08-28 RX ADMIN — ACETAMINOPHEN 975 MG: 325 TABLET, FILM COATED ORAL at 05:15

## 2017-08-28 RX ADMIN — METHOCARBAMOL 500 MG: 500 TABLET ORAL at 17:20

## 2017-08-28 RX ADMIN — FLUTICASONE PROPIONATE 2 PUFF: 44 AEROSOL, METERED RESPIRATORY (INHALATION) at 17:20

## 2017-08-28 RX ADMIN — DOCUSATE SODIUM 100 MG: 100 CAPSULE, LIQUID FILLED ORAL at 08:41

## 2017-08-28 RX ADMIN — LEVETIRACETAM 500 MG: 500 TABLET ORAL at 21:32

## 2017-08-28 RX ADMIN — METHOCARBAMOL 500 MG: 500 TABLET ORAL at 11:33

## 2017-08-28 RX ADMIN — FLUTICASONE PROPIONATE 2 PUFF: 44 AEROSOL, METERED RESPIRATORY (INHALATION) at 08:41

## 2017-08-28 RX ADMIN — POTASSIUM CHLORIDE 20 MEQ: 1500 TABLET, EXTENDED RELEASE ORAL at 06:57

## 2017-08-28 RX ADMIN — OXYCODONE HYDROCHLORIDE 2.5 MG: 5 TABLET ORAL at 08:46

## 2017-08-28 RX ADMIN — MODAFINIL 200 MG: 100 TABLET ORAL at 11:33

## 2017-08-28 RX ADMIN — LEVOTHYROXINE SODIUM 75 MCG: 75 TABLET ORAL at 05:15

## 2017-08-29 PROBLEM — R26.2 AMBULATORY DYSFUNCTION: Status: ACTIVE | Noted: 2017-08-29
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
## 2017-08-29 PROBLEM — R53.81 PHYSICAL DECONDITIONING: Status: ACTIVE | Noted: 2017-08-29

## 2017-08-29 PROBLEM — E03.9 HYPOTHYROID: Status: ACTIVE | Noted: 2017-08-29

## 2017-08-29 LAB
ANION GAP SERPL CALCULATED.3IONS-SCNC: 6 MMOL/L (ref 4–13)
BASOPHILS # BLD AUTO: 0.01 THOUSANDS/ΜL (ref 0–0.1)
BASOPHILS NFR BLD AUTO: 0 % (ref 0–1)
BUN SERPL-MCNC: 9 MG/DL (ref 5–25)
CALCIUM SERPL-MCNC: 8.1 MG/DL (ref 8.3–10.1)
CHLORIDE SERPL-SCNC: 110 MMOL/L (ref 100–108)
CO2 SERPL-SCNC: 27 MMOL/L (ref 21–32)
CREAT SERPL-MCNC: 0.43 MG/DL (ref 0.6–1.3)
EOSINOPHIL # BLD AUTO: 0.12 THOUSAND/ΜL (ref 0–0.61)
EOSINOPHIL NFR BLD AUTO: 2 % (ref 0–6)
ERYTHROCYTE [DISTWIDTH] IN BLOOD BY AUTOMATED COUNT: 13.2 % (ref 11.6–15.1)
GFR SERPL CREATININE-BSD FRML MDRD: 108 ML/MIN/1.73SQ M
GLUCOSE SERPL-MCNC: 91 MG/DL (ref 65–140)
HCT VFR BLD AUTO: 32.8 % (ref 34.8–46.1)
HGB BLD-MCNC: 10.9 G/DL (ref 11.5–15.4)
LYMPHOCYTES # BLD AUTO: 1.99 THOUSANDS/ΜL (ref 0.6–4.47)
LYMPHOCYTES NFR BLD AUTO: 29 % (ref 14–44)
MCH RBC QN AUTO: 32 PG (ref 26.8–34.3)
MCHC RBC AUTO-ENTMCNC: 33.2 G/DL (ref 31.4–37.4)
MCV RBC AUTO: 96 FL (ref 82–98)
MONOCYTES # BLD AUTO: 0.5 THOUSAND/ΜL (ref 0.17–1.22)
MONOCYTES NFR BLD AUTO: 7 % (ref 4–12)
NEUTROPHILS # BLD AUTO: 4.13 THOUSANDS/ΜL (ref 1.85–7.62)
NEUTS SEG NFR BLD AUTO: 62 % (ref 43–75)
NRBC BLD AUTO-RTO: 0 /100 WBCS
PLATELET # BLD AUTO: 218 THOUSANDS/UL (ref 149–390)
PMV BLD AUTO: 9.7 FL (ref 8.9–12.7)
POTASSIUM SERPL-SCNC: 3.5 MMOL/L (ref 3.5–5.3)
RBC # BLD AUTO: 3.41 MILLION/UL (ref 3.81–5.12)
SODIUM SERPL-SCNC: 143 MMOL/L (ref 136–145)
WBC # BLD AUTO: 6.76 THOUSAND/UL (ref 4.31–10.16)

## 2017-08-29 PROCEDURE — 97167 OT EVAL HIGH COMPLEX 60 MIN: CPT

## 2017-08-29 PROCEDURE — G8987 SELF CARE CURRENT STATUS: HCPCS

## 2017-08-29 PROCEDURE — G8988 SELF CARE GOAL STATUS: HCPCS

## 2017-08-29 PROCEDURE — G8979 MOBILITY GOAL STATUS: HCPCS

## 2017-08-29 PROCEDURE — 85025 COMPLETE CBC W/AUTO DIFF WBC: CPT | Performed by: EMERGENCY MEDICINE

## 2017-08-29 PROCEDURE — 97163 PT EVAL HIGH COMPLEX 45 MIN: CPT

## 2017-08-29 PROCEDURE — G8978 MOBILITY CURRENT STATUS: HCPCS

## 2017-08-29 PROCEDURE — 80048 BASIC METABOLIC PNL TOTAL CA: CPT | Performed by: EMERGENCY MEDICINE

## 2017-08-29 RX ORDER — MELATONIN
1000 DAILY
Status: DISCONTINUED | OUTPATIENT
Start: 2017-08-29 | End: 2017-09-05 | Stop reason: HOSPADM

## 2017-08-29 RX ORDER — AMOXICILLIN AND CLAVULANATE POTASSIUM 875; 125 MG/1; MG/1
1 TABLET, FILM COATED ORAL EVERY 12 HOURS SCHEDULED
Status: COMPLETED | OUTPATIENT
Start: 2017-08-29 | End: 2017-09-05

## 2017-08-29 RX ORDER — POTASSIUM CHLORIDE 20 MEQ/1
40 TABLET, EXTENDED RELEASE ORAL ONCE
Status: COMPLETED | OUTPATIENT
Start: 2017-08-29 | End: 2017-08-29

## 2017-08-29 RX ORDER — HEPARIN SODIUM 5000 [USP'U]/ML
5000 INJECTION, SOLUTION INTRAVENOUS; SUBCUTANEOUS EVERY 8 HOURS SCHEDULED
Status: DISCONTINUED | OUTPATIENT
Start: 2017-08-29 | End: 2017-08-29

## 2017-08-29 RX ORDER — AMOXICILLIN AND CLAVULANATE POTASSIUM 875; 125 MG/1; MG/1
1 TABLET, FILM COATED ORAL EVERY 12 HOURS SCHEDULED
Status: DISCONTINUED | OUTPATIENT
Start: 2017-08-29 | End: 2017-08-29

## 2017-08-29 RX ADMIN — POTASSIUM CHLORIDE 40 MEQ: 1500 TABLET, EXTENDED RELEASE ORAL at 06:41

## 2017-08-29 RX ADMIN — METHOCARBAMOL 500 MG: 500 TABLET ORAL at 23:09

## 2017-08-29 RX ADMIN — MODAFINIL 200 MG: 100 TABLET ORAL at 08:49

## 2017-08-29 RX ADMIN — VITAMIN D, TAB 1000IU (100/BT) 1000 UNITS: 25 TAB at 15:52

## 2017-08-29 RX ADMIN — METHOCARBAMOL 500 MG: 500 TABLET ORAL at 05:04

## 2017-08-29 RX ADMIN — LIDOCAINE 1 PATCH: 50 PATCH CUTANEOUS at 10:51

## 2017-08-29 RX ADMIN — LEVETIRACETAM 500 MG: 500 TABLET ORAL at 21:51

## 2017-08-29 RX ADMIN — FLUTICASONE PROPIONATE 2 PUFF: 44 AEROSOL, METERED RESPIRATORY (INHALATION) at 08:45

## 2017-08-29 RX ADMIN — OXYCODONE HYDROCHLORIDE 5 MG: 5 TABLET ORAL at 23:12

## 2017-08-29 RX ADMIN — LEVOTHYROXINE SODIUM 75 MCG: 75 TABLET ORAL at 05:04

## 2017-08-29 RX ADMIN — ACETAMINOPHEN 975 MG: 325 TABLET, FILM COATED ORAL at 06:21

## 2017-08-29 RX ADMIN — LEVETIRACETAM 500 MG: 500 TABLET ORAL at 08:45

## 2017-08-29 RX ADMIN — OXYCODONE HYDROCHLORIDE 5 MG: 5 TABLET ORAL at 08:44

## 2017-08-29 RX ADMIN — ACETAMINOPHEN 975 MG: 325 TABLET, FILM COATED ORAL at 12:12

## 2017-08-29 RX ADMIN — DOCUSATE SODIUM 100 MG: 100 CAPSULE, LIQUID FILLED ORAL at 08:45

## 2017-08-29 RX ADMIN — ACETAMINOPHEN 975 MG: 325 TABLET, FILM COATED ORAL at 19:21

## 2017-08-29 RX ADMIN — ENOXAPARIN SODIUM 40 MG: 40 INJECTION SUBCUTANEOUS at 10:54

## 2017-08-29 RX ADMIN — METHOCARBAMOL 500 MG: 500 TABLET ORAL at 12:12

## 2017-08-29 RX ADMIN — METHOCARBAMOL 500 MG: 500 TABLET ORAL at 19:21

## 2017-08-29 RX ADMIN — OXYCODONE HYDROCHLORIDE 5 MG: 5 TABLET ORAL at 14:45

## 2017-08-29 RX ADMIN — AMOXICILLIN AND CLAVULANATE POTASSIUM 1 TABLET: 875; 125 TABLET, FILM COATED ORAL at 23:09

## 2017-08-29 RX ADMIN — SENNOSIDES 8.6 MG: 8.6 TABLET, FILM COATED ORAL at 21:51

## 2017-08-29 RX ADMIN — Medication 1 TABLET: at 08:45

## 2017-08-29 RX ADMIN — DOCUSATE SODIUM 100 MG: 100 CAPSULE, LIQUID FILLED ORAL at 19:21

## 2017-08-29 RX ADMIN — ACETAMINOPHEN 975 MG: 325 TABLET, FILM COATED ORAL at 00:05

## 2017-08-30 LAB
HCT VFR BLD AUTO: 34.1 % (ref 34.8–46.1)
HGB BLD-MCNC: 11.4 G/DL (ref 11.5–15.4)

## 2017-08-30 PROCEDURE — 85014 HEMATOCRIT: CPT | Performed by: PHYSICIAN ASSISTANT

## 2017-08-30 PROCEDURE — 97532 HB COGNITIVE SKILLS DEVELOPMENT: CPT

## 2017-08-30 PROCEDURE — 94660 CPAP INITIATION&MGMT: CPT

## 2017-08-30 PROCEDURE — 97110 THERAPEUTIC EXERCISES: CPT

## 2017-08-30 PROCEDURE — 97116 GAIT TRAINING THERAPY: CPT

## 2017-08-30 PROCEDURE — 97535 SELF CARE MNGMENT TRAINING: CPT

## 2017-08-30 PROCEDURE — 97530 THERAPEUTIC ACTIVITIES: CPT

## 2017-08-30 PROCEDURE — 94760 N-INVAS EAR/PLS OXIMETRY 1: CPT

## 2017-08-30 PROCEDURE — 85018 HEMOGLOBIN: CPT | Performed by: PHYSICIAN ASSISTANT

## 2017-08-30 RX ORDER — POLYETHYLENE GLYCOL 3350 17 G/17G
17 POWDER, FOR SOLUTION ORAL DAILY PRN
Status: DISCONTINUED | OUTPATIENT
Start: 2017-08-30 | End: 2017-09-05 | Stop reason: HOSPADM

## 2017-08-30 RX ADMIN — LEVETIRACETAM 500 MG: 500 TABLET ORAL at 08:42

## 2017-08-30 RX ADMIN — MODAFINIL 200 MG: 100 TABLET ORAL at 08:46

## 2017-08-30 RX ADMIN — FLUTICASONE PROPIONATE 2 PUFF: 44 AEROSOL, METERED RESPIRATORY (INHALATION) at 18:50

## 2017-08-30 RX ADMIN — LEVOTHYROXINE SODIUM 75 MCG: 75 TABLET ORAL at 05:44

## 2017-08-30 RX ADMIN — AMOXICILLIN AND CLAVULANATE POTASSIUM 1 TABLET: 875; 125 TABLET, FILM COATED ORAL at 21:09

## 2017-08-30 RX ADMIN — METHOCARBAMOL 500 MG: 500 TABLET ORAL at 05:44

## 2017-08-30 RX ADMIN — VITAMIN D, TAB 1000IU (100/BT) 1000 UNITS: 25 TAB at 08:42

## 2017-08-30 RX ADMIN — DOCUSATE SODIUM 100 MG: 100 CAPSULE, LIQUID FILLED ORAL at 18:49

## 2017-08-30 RX ADMIN — DOCUSATE SODIUM 100 MG: 100 CAPSULE, LIQUID FILLED ORAL at 08:42

## 2017-08-30 RX ADMIN — OXYCODONE HYDROCHLORIDE 5 MG: 5 TABLET ORAL at 08:42

## 2017-08-30 RX ADMIN — METHOCARBAMOL 500 MG: 500 TABLET ORAL at 12:23

## 2017-08-30 RX ADMIN — ACETAMINOPHEN 975 MG: 325 TABLET, FILM COATED ORAL at 12:22

## 2017-08-30 RX ADMIN — Medication 1 TABLET: at 10:52

## 2017-08-30 RX ADMIN — METHOCARBAMOL 500 MG: 500 TABLET ORAL at 23:43

## 2017-08-30 RX ADMIN — AMOXICILLIN AND CLAVULANATE POTASSIUM 1 TABLET: 875; 125 TABLET, FILM COATED ORAL at 08:42

## 2017-08-30 RX ADMIN — ACETAMINOPHEN 975 MG: 325 TABLET, FILM COATED ORAL at 05:43

## 2017-08-30 RX ADMIN — ENOXAPARIN SODIUM 40 MG: 40 INJECTION SUBCUTANEOUS at 08:42

## 2017-08-30 RX ADMIN — OXYCODONE HYDROCHLORIDE 5 MG: 5 TABLET ORAL at 21:09

## 2017-08-30 RX ADMIN — ACETAMINOPHEN 975 MG: 325 TABLET, FILM COATED ORAL at 23:42

## 2017-08-30 RX ADMIN — FLUTICASONE PROPIONATE 2 PUFF: 44 AEROSOL, METERED RESPIRATORY (INHALATION) at 08:42

## 2017-08-30 RX ADMIN — METHOCARBAMOL 500 MG: 500 TABLET ORAL at 18:49

## 2017-08-30 RX ADMIN — LEVETIRACETAM 500 MG: 500 TABLET ORAL at 21:09

## 2017-08-30 RX ADMIN — LIDOCAINE 1 PATCH: 50 PATCH CUTANEOUS at 12:23

## 2017-08-30 RX ADMIN — SENNOSIDES 8.6 MG: 8.6 TABLET, FILM COATED ORAL at 21:09

## 2017-08-30 RX ADMIN — ACETAMINOPHEN 975 MG: 325 TABLET, FILM COATED ORAL at 18:49

## 2017-08-31 PROCEDURE — 97535 SELF CARE MNGMENT TRAINING: CPT

## 2017-08-31 PROCEDURE — 94760 N-INVAS EAR/PLS OXIMETRY 1: CPT

## 2017-08-31 PROCEDURE — 97530 THERAPEUTIC ACTIVITIES: CPT

## 2017-08-31 PROCEDURE — 94660 CPAP INITIATION&MGMT: CPT

## 2017-08-31 RX ADMIN — AMOXICILLIN AND CLAVULANATE POTASSIUM 1 TABLET: 875; 125 TABLET, FILM COATED ORAL at 20:56

## 2017-08-31 RX ADMIN — FLUTICASONE PROPIONATE 2 PUFF: 44 AEROSOL, METERED RESPIRATORY (INHALATION) at 10:54

## 2017-08-31 RX ADMIN — AMOXICILLIN AND CLAVULANATE POTASSIUM 1 TABLET: 875; 125 TABLET, FILM COATED ORAL at 10:50

## 2017-08-31 RX ADMIN — METHOCARBAMOL 500 MG: 500 TABLET ORAL at 11:07

## 2017-08-31 RX ADMIN — FLUTICASONE PROPIONATE 2 PUFF: 44 AEROSOL, METERED RESPIRATORY (INHALATION) at 17:48

## 2017-08-31 RX ADMIN — DOCUSATE SODIUM 100 MG: 100 CAPSULE, LIQUID FILLED ORAL at 10:51

## 2017-08-31 RX ADMIN — LEVETIRACETAM 500 MG: 500 TABLET ORAL at 20:56

## 2017-08-31 RX ADMIN — ACETAMINOPHEN 975 MG: 325 TABLET, FILM COATED ORAL at 05:19

## 2017-08-31 RX ADMIN — DOCUSATE SODIUM 100 MG: 100 CAPSULE, LIQUID FILLED ORAL at 17:48

## 2017-08-31 RX ADMIN — METHOCARBAMOL 500 MG: 500 TABLET ORAL at 17:48

## 2017-08-31 RX ADMIN — LEVOTHYROXINE SODIUM 75 MCG: 75 TABLET ORAL at 05:19

## 2017-08-31 RX ADMIN — METHOCARBAMOL 500 MG: 500 TABLET ORAL at 23:15

## 2017-08-31 RX ADMIN — ACETAMINOPHEN 975 MG: 325 TABLET, FILM COATED ORAL at 17:48

## 2017-08-31 RX ADMIN — ENOXAPARIN SODIUM 40 MG: 40 INJECTION SUBCUTANEOUS at 10:53

## 2017-08-31 RX ADMIN — ACETAMINOPHEN 975 MG: 325 TABLET, FILM COATED ORAL at 23:15

## 2017-08-31 RX ADMIN — VITAMIN D, TAB 1000IU (100/BT) 1000 UNITS: 25 TAB at 10:51

## 2017-08-31 RX ADMIN — SENNOSIDES 8.6 MG: 8.6 TABLET, FILM COATED ORAL at 20:57

## 2017-08-31 RX ADMIN — LEVETIRACETAM 500 MG: 500 TABLET ORAL at 10:51

## 2017-08-31 RX ADMIN — METHOCARBAMOL 500 MG: 500 TABLET ORAL at 05:19

## 2017-08-31 RX ADMIN — LIDOCAINE 1 PATCH: 50 PATCH CUTANEOUS at 10:53

## 2017-08-31 RX ADMIN — OXYCODONE HYDROCHLORIDE 5 MG: 5 TABLET ORAL at 16:51

## 2017-08-31 RX ADMIN — OXYCODONE HYDROCHLORIDE 5 MG: 5 TABLET ORAL at 10:52

## 2017-08-31 RX ADMIN — OXYCODONE HYDROCHLORIDE 5 MG: 5 TABLET ORAL at 20:57

## 2017-08-31 RX ADMIN — Medication 1 TABLET: at 10:51

## 2017-09-01 PROCEDURE — 94660 CPAP INITIATION&MGMT: CPT

## 2017-09-01 PROCEDURE — 97535 SELF CARE MNGMENT TRAINING: CPT

## 2017-09-01 PROCEDURE — 94760 N-INVAS EAR/PLS OXIMETRY 1: CPT

## 2017-09-01 RX ORDER — BISACODYL 10 MG
10 SUPPOSITORY, RECTAL RECTAL DAILY PRN
Status: DISCONTINUED | OUTPATIENT
Start: 2017-09-01 | End: 2017-09-05 | Stop reason: HOSPADM

## 2017-09-01 RX ADMIN — FLUTICASONE PROPIONATE 2 PUFF: 44 AEROSOL, METERED RESPIRATORY (INHALATION) at 20:20

## 2017-09-01 RX ADMIN — SENNOSIDES 8.6 MG: 8.6 TABLET, FILM COATED ORAL at 22:33

## 2017-09-01 RX ADMIN — METHOCARBAMOL 500 MG: 500 TABLET ORAL at 17:32

## 2017-09-01 RX ADMIN — METHOCARBAMOL 500 MG: 500 TABLET ORAL at 06:17

## 2017-09-01 RX ADMIN — ACETAMINOPHEN 975 MG: 325 TABLET, FILM COATED ORAL at 17:32

## 2017-09-01 RX ADMIN — MODAFINIL 200 MG: 100 TABLET ORAL at 09:14

## 2017-09-01 RX ADMIN — Medication 1 TABLET: at 09:14

## 2017-09-01 RX ADMIN — FLUTICASONE PROPIONATE 2 PUFF: 44 AEROSOL, METERED RESPIRATORY (INHALATION) at 09:14

## 2017-09-01 RX ADMIN — LIDOCAINE 1 PATCH: 50 PATCH CUTANEOUS at 11:10

## 2017-09-01 RX ADMIN — ACETAMINOPHEN 975 MG: 325 TABLET, FILM COATED ORAL at 06:17

## 2017-09-01 RX ADMIN — AMOXICILLIN AND CLAVULANATE POTASSIUM 1 TABLET: 875; 125 TABLET, FILM COATED ORAL at 09:13

## 2017-09-01 RX ADMIN — AMOXICILLIN AND CLAVULANATE POTASSIUM 1 TABLET: 875; 125 TABLET, FILM COATED ORAL at 20:20

## 2017-09-01 RX ADMIN — LEVETIRACETAM 500 MG: 500 TABLET ORAL at 20:20

## 2017-09-01 RX ADMIN — DOCUSATE SODIUM 100 MG: 100 CAPSULE, LIQUID FILLED ORAL at 17:32

## 2017-09-01 RX ADMIN — LEVOTHYROXINE SODIUM 75 MCG: 75 TABLET ORAL at 06:17

## 2017-09-01 RX ADMIN — OXYCODONE HYDROCHLORIDE 2.5 MG: 5 TABLET ORAL at 20:20

## 2017-09-01 RX ADMIN — POLYETHYLENE GLYCOL 3350 17 G: 17 POWDER, FOR SOLUTION ORAL at 09:14

## 2017-09-01 RX ADMIN — METHOCARBAMOL 500 MG: 500 TABLET ORAL at 11:10

## 2017-09-01 RX ADMIN — ENOXAPARIN SODIUM 40 MG: 40 INJECTION SUBCUTANEOUS at 09:14

## 2017-09-01 RX ADMIN — VITAMIN D, TAB 1000IU (100/BT) 1000 UNITS: 25 TAB at 09:14

## 2017-09-01 RX ADMIN — ACETAMINOPHEN 975 MG: 325 TABLET, FILM COATED ORAL at 11:10

## 2017-09-01 RX ADMIN — DOCUSATE SODIUM 100 MG: 100 CAPSULE, LIQUID FILLED ORAL at 09:14

## 2017-09-01 RX ADMIN — LEVETIRACETAM 500 MG: 500 TABLET ORAL at 09:13

## 2017-09-02 PROCEDURE — 97535 SELF CARE MNGMENT TRAINING: CPT

## 2017-09-02 PROCEDURE — 94660 CPAP INITIATION&MGMT: CPT

## 2017-09-02 PROCEDURE — 97110 THERAPEUTIC EXERCISES: CPT

## 2017-09-02 PROCEDURE — 94760 N-INVAS EAR/PLS OXIMETRY 1: CPT

## 2017-09-02 PROCEDURE — 97530 THERAPEUTIC ACTIVITIES: CPT

## 2017-09-02 PROCEDURE — 97116 GAIT TRAINING THERAPY: CPT

## 2017-09-02 RX ADMIN — OXYCODONE HYDROCHLORIDE 2.5 MG: 5 TABLET ORAL at 19:51

## 2017-09-02 RX ADMIN — METHOCARBAMOL 500 MG: 500 TABLET ORAL at 11:43

## 2017-09-02 RX ADMIN — SENNOSIDES 8.6 MG: 8.6 TABLET, FILM COATED ORAL at 21:14

## 2017-09-02 RX ADMIN — Medication 1 TABLET: at 09:30

## 2017-09-02 RX ADMIN — ACETAMINOPHEN 975 MG: 325 TABLET, FILM COATED ORAL at 11:43

## 2017-09-02 RX ADMIN — OXYCODONE HYDROCHLORIDE 5 MG: 5 TABLET ORAL at 11:46

## 2017-09-02 RX ADMIN — ACETAMINOPHEN 975 MG: 325 TABLET, FILM COATED ORAL at 05:19

## 2017-09-02 RX ADMIN — LEVOTHYROXINE SODIUM 75 MCG: 75 TABLET ORAL at 05:19

## 2017-09-02 RX ADMIN — METHOCARBAMOL 500 MG: 500 TABLET ORAL at 17:30

## 2017-09-02 RX ADMIN — LEVETIRACETAM 500 MG: 500 TABLET ORAL at 09:29

## 2017-09-02 RX ADMIN — ENOXAPARIN SODIUM 40 MG: 40 INJECTION SUBCUTANEOUS at 09:30

## 2017-09-02 RX ADMIN — DOCUSATE SODIUM 100 MG: 100 CAPSULE, LIQUID FILLED ORAL at 09:30

## 2017-09-02 RX ADMIN — MODAFINIL 200 MG: 100 TABLET ORAL at 09:34

## 2017-09-02 RX ADMIN — AMOXICILLIN AND CLAVULANATE POTASSIUM 1 TABLET: 875; 125 TABLET, FILM COATED ORAL at 09:30

## 2017-09-02 RX ADMIN — FLUTICASONE PROPIONATE 2 PUFF: 44 AEROSOL, METERED RESPIRATORY (INHALATION) at 09:29

## 2017-09-02 RX ADMIN — METHOCARBAMOL 500 MG: 500 TABLET ORAL at 00:42

## 2017-09-02 RX ADMIN — ACETAMINOPHEN 975 MG: 325 TABLET, FILM COATED ORAL at 17:30

## 2017-09-02 RX ADMIN — AMOXICILLIN AND CLAVULANATE POTASSIUM 1 TABLET: 875; 125 TABLET, FILM COATED ORAL at 21:14

## 2017-09-02 RX ADMIN — DOCUSATE SODIUM 100 MG: 100 CAPSULE, LIQUID FILLED ORAL at 17:30

## 2017-09-02 RX ADMIN — METHOCARBAMOL 500 MG: 500 TABLET ORAL at 05:18

## 2017-09-02 RX ADMIN — LEVETIRACETAM 500 MG: 500 TABLET ORAL at 21:14

## 2017-09-02 RX ADMIN — VITAMIN D, TAB 1000IU (100/BT) 1000 UNITS: 25 TAB at 09:29

## 2017-09-02 RX ADMIN — LIDOCAINE 1 PATCH: 50 PATCH CUTANEOUS at 11:43

## 2017-09-02 RX ADMIN — FLUTICASONE PROPIONATE 2 PUFF: 44 AEROSOL, METERED RESPIRATORY (INHALATION) at 17:29

## 2017-09-02 RX ADMIN — ACETAMINOPHEN 975 MG: 325 TABLET, FILM COATED ORAL at 00:42

## 2017-09-03 RX ORDER — ACETAMINOPHEN 325 MG/1
650 TABLET ORAL EVERY 6 HOURS SCHEDULED
Status: DISCONTINUED | OUTPATIENT
Start: 2017-09-03 | End: 2017-09-05 | Stop reason: HOSPADM

## 2017-09-03 RX ADMIN — METHOCARBAMOL 500 MG: 500 TABLET ORAL at 18:51

## 2017-09-03 RX ADMIN — OXYCODONE HYDROCHLORIDE 5 MG: 5 TABLET ORAL at 09:08

## 2017-09-03 RX ADMIN — LIDOCAINE 1 PATCH: 50 PATCH CUTANEOUS at 11:43

## 2017-09-03 RX ADMIN — OXYCODONE HYDROCHLORIDE 5 MG: 5 TABLET ORAL at 14:18

## 2017-09-03 RX ADMIN — METHOCARBAMOL 500 MG: 500 TABLET ORAL at 11:43

## 2017-09-03 RX ADMIN — METHOCARBAMOL 500 MG: 500 TABLET ORAL at 05:40

## 2017-09-03 RX ADMIN — LEVETIRACETAM 500 MG: 500 TABLET ORAL at 09:09

## 2017-09-03 RX ADMIN — ACETAMINOPHEN 650 MG: 325 TABLET ORAL at 18:51

## 2017-09-03 RX ADMIN — OXYCODONE HYDROCHLORIDE 5 MG: 5 TABLET ORAL at 19:57

## 2017-09-03 RX ADMIN — AMOXICILLIN AND CLAVULANATE POTASSIUM 1 TABLET: 875; 125 TABLET, FILM COATED ORAL at 09:09

## 2017-09-03 RX ADMIN — MODAFINIL 200 MG: 100 TABLET ORAL at 09:09

## 2017-09-03 RX ADMIN — ACETAMINOPHEN 650 MG: 325 TABLET ORAL at 14:19

## 2017-09-03 RX ADMIN — ACETAMINOPHEN 975 MG: 325 TABLET, FILM COATED ORAL at 05:40

## 2017-09-03 RX ADMIN — OXYCODONE HYDROCHLORIDE 2.5 MG: 5 TABLET ORAL at 00:18

## 2017-09-03 RX ADMIN — DOCUSATE SODIUM 100 MG: 100 CAPSULE, LIQUID FILLED ORAL at 18:51

## 2017-09-03 RX ADMIN — LEVETIRACETAM 500 MG: 500 TABLET ORAL at 21:32

## 2017-09-03 RX ADMIN — ENOXAPARIN SODIUM 40 MG: 40 INJECTION SUBCUTANEOUS at 09:08

## 2017-09-03 RX ADMIN — AMOXICILLIN AND CLAVULANATE POTASSIUM 1 TABLET: 875; 125 TABLET, FILM COATED ORAL at 21:32

## 2017-09-03 RX ADMIN — SENNOSIDES 8.6 MG: 8.6 TABLET, FILM COATED ORAL at 21:32

## 2017-09-03 RX ADMIN — DOCUSATE SODIUM 100 MG: 100 CAPSULE, LIQUID FILLED ORAL at 09:09

## 2017-09-03 RX ADMIN — METHOCARBAMOL 500 MG: 500 TABLET ORAL at 00:11

## 2017-09-03 RX ADMIN — Medication 1 TABLET: at 09:09

## 2017-09-03 RX ADMIN — VITAMIN D, TAB 1000IU (100/BT) 1000 UNITS: 25 TAB at 09:08

## 2017-09-03 RX ADMIN — FLUTICASONE PROPIONATE 2 PUFF: 44 AEROSOL, METERED RESPIRATORY (INHALATION) at 19:52

## 2017-09-03 RX ADMIN — FLUTICASONE PROPIONATE 2 PUFF: 44 AEROSOL, METERED RESPIRATORY (INHALATION) at 09:08

## 2017-09-03 RX ADMIN — LEVOTHYROXINE SODIUM 75 MCG: 75 TABLET ORAL at 05:40

## 2017-09-04 LAB
ANION GAP SERPL CALCULATED.3IONS-SCNC: 6 MMOL/L (ref 4–13)
BASOPHILS # BLD AUTO: 0.03 THOUSANDS/ΜL (ref 0–0.1)
BASOPHILS NFR BLD AUTO: 0 % (ref 0–1)
BUN SERPL-MCNC: 15 MG/DL (ref 5–25)
CALCIUM SERPL-MCNC: 8.7 MG/DL (ref 8.3–10.1)
CHLORIDE SERPL-SCNC: 106 MMOL/L (ref 100–108)
CO2 SERPL-SCNC: 28 MMOL/L (ref 21–32)
CREAT SERPL-MCNC: 0.5 MG/DL (ref 0.6–1.3)
EOSINOPHIL # BLD AUTO: 0.18 THOUSAND/ΜL (ref 0–0.61)
EOSINOPHIL NFR BLD AUTO: 2 % (ref 0–6)
ERYTHROCYTE [DISTWIDTH] IN BLOOD BY AUTOMATED COUNT: 13.6 % (ref 11.6–15.1)
GFR SERPL CREATININE-BSD FRML MDRD: 103 ML/MIN/1.73SQ M
GLUCOSE SERPL-MCNC: 82 MG/DL (ref 65–140)
HCT VFR BLD AUTO: 36.5 % (ref 34.8–46.1)
HGB BLD-MCNC: 11.8 G/DL (ref 11.5–15.4)
LYMPHOCYTES # BLD AUTO: 2.31 THOUSANDS/ΜL (ref 0.6–4.47)
LYMPHOCYTES NFR BLD AUTO: 31 % (ref 14–44)
MCH RBC QN AUTO: 31.1 PG (ref 26.8–34.3)
MCHC RBC AUTO-ENTMCNC: 32.3 G/DL (ref 31.4–37.4)
MCV RBC AUTO: 96 FL (ref 82–98)
MONOCYTES # BLD AUTO: 0.55 THOUSAND/ΜL (ref 0.17–1.22)
MONOCYTES NFR BLD AUTO: 7 % (ref 4–12)
NEUTROPHILS # BLD AUTO: 4.46 THOUSANDS/ΜL (ref 1.85–7.62)
NEUTS SEG NFR BLD AUTO: 60 % (ref 43–75)
NRBC BLD AUTO-RTO: 0 /100 WBCS
PLATELET # BLD AUTO: 382 THOUSANDS/UL (ref 149–390)
PMV BLD AUTO: 9.2 FL (ref 8.9–12.7)
POTASSIUM SERPL-SCNC: 3.6 MMOL/L (ref 3.5–5.3)
RBC # BLD AUTO: 3.79 MILLION/UL (ref 3.81–5.12)
SODIUM SERPL-SCNC: 140 MMOL/L (ref 136–145)
WBC # BLD AUTO: 7.55 THOUSAND/UL (ref 4.31–10.16)

## 2017-09-04 PROCEDURE — 97535 SELF CARE MNGMENT TRAINING: CPT

## 2017-09-04 PROCEDURE — 97530 THERAPEUTIC ACTIVITIES: CPT

## 2017-09-04 PROCEDURE — 80048 BASIC METABOLIC PNL TOTAL CA: CPT | Performed by: NURSE PRACTITIONER

## 2017-09-04 PROCEDURE — 85025 COMPLETE CBC W/AUTO DIFF WBC: CPT | Performed by: NURSE PRACTITIONER

## 2017-09-04 PROCEDURE — 94660 CPAP INITIATION&MGMT: CPT

## 2017-09-04 PROCEDURE — 94760 N-INVAS EAR/PLS OXIMETRY 1: CPT

## 2017-09-04 RX ADMIN — FLUTICASONE PROPIONATE 2 PUFF: 44 AEROSOL, METERED RESPIRATORY (INHALATION) at 18:01

## 2017-09-04 RX ADMIN — SENNOSIDES 8.6 MG: 8.6 TABLET, FILM COATED ORAL at 21:01

## 2017-09-04 RX ADMIN — MODAFINIL 200 MG: 100 TABLET ORAL at 09:11

## 2017-09-04 RX ADMIN — LIDOCAINE 1 PATCH: 50 PATCH CUTANEOUS at 11:46

## 2017-09-04 RX ADMIN — FLUTICASONE PROPIONATE 2 PUFF: 44 AEROSOL, METERED RESPIRATORY (INHALATION) at 09:06

## 2017-09-04 RX ADMIN — METHOCARBAMOL 500 MG: 500 TABLET ORAL at 00:08

## 2017-09-04 RX ADMIN — LEVOTHYROXINE SODIUM 75 MCG: 75 TABLET ORAL at 05:00

## 2017-09-04 RX ADMIN — ACETAMINOPHEN 650 MG: 325 TABLET ORAL at 17:59

## 2017-09-04 RX ADMIN — OXYCODONE HYDROCHLORIDE 5 MG: 5 TABLET ORAL at 13:11

## 2017-09-04 RX ADMIN — METHOCARBAMOL 500 MG: 500 TABLET ORAL at 11:47

## 2017-09-04 RX ADMIN — AMOXICILLIN AND CLAVULANATE POTASSIUM 1 TABLET: 875; 125 TABLET, FILM COATED ORAL at 09:06

## 2017-09-04 RX ADMIN — Medication 1 TABLET: at 09:06

## 2017-09-04 RX ADMIN — VITAMIN D, TAB 1000IU (100/BT) 1000 UNITS: 25 TAB at 09:05

## 2017-09-04 RX ADMIN — AMOXICILLIN AND CLAVULANATE POTASSIUM 1 TABLET: 875; 125 TABLET, FILM COATED ORAL at 20:45

## 2017-09-04 RX ADMIN — ACETAMINOPHEN 650 MG: 325 TABLET ORAL at 05:00

## 2017-09-04 RX ADMIN — METHOCARBAMOL 500 MG: 500 TABLET ORAL at 23:47

## 2017-09-04 RX ADMIN — ACETAMINOPHEN 650 MG: 325 TABLET ORAL at 00:08

## 2017-09-04 RX ADMIN — METHOCARBAMOL 500 MG: 500 TABLET ORAL at 17:59

## 2017-09-04 RX ADMIN — ENOXAPARIN SODIUM 40 MG: 40 INJECTION SUBCUTANEOUS at 09:04

## 2017-09-04 RX ADMIN — METHOCARBAMOL 500 MG: 500 TABLET ORAL at 05:00

## 2017-09-04 RX ADMIN — ACETAMINOPHEN 650 MG: 325 TABLET ORAL at 23:47

## 2017-09-04 RX ADMIN — DOCUSATE SODIUM 100 MG: 100 CAPSULE, LIQUID FILLED ORAL at 17:59

## 2017-09-04 RX ADMIN — DOCUSATE SODIUM 100 MG: 100 CAPSULE, LIQUID FILLED ORAL at 09:05

## 2017-09-04 RX ADMIN — ACETAMINOPHEN 650 MG: 325 TABLET ORAL at 11:47

## 2017-09-04 RX ADMIN — OXYCODONE HYDROCHLORIDE 5 MG: 5 TABLET ORAL at 09:05

## 2017-09-05 VITALS
RESPIRATION RATE: 18 BRPM | SYSTOLIC BLOOD PRESSURE: 119 MMHG | TEMPERATURE: 98.9 F | HEIGHT: 63 IN | DIASTOLIC BLOOD PRESSURE: 56 MMHG | OXYGEN SATURATION: 96 % | HEART RATE: 83 BPM | WEIGHT: 121.3 LBS | BODY MASS INDEX: 21.49 KG/M2

## 2017-09-05 PROCEDURE — 94660 CPAP INITIATION&MGMT: CPT

## 2017-09-05 PROCEDURE — 94760 N-INVAS EAR/PLS OXIMETRY 1: CPT

## 2017-09-05 PROCEDURE — 97116 GAIT TRAINING THERAPY: CPT

## 2017-09-05 PROCEDURE — 97110 THERAPEUTIC EXERCISES: CPT

## 2017-09-05 RX ORDER — METHOCARBAMOL 500 MG/1
250 TABLET, FILM COATED ORAL EVERY 6 HOURS SCHEDULED
Qty: 20 TABLET | Refills: 0 | Status: SHIPPED | OUTPATIENT
Start: 2017-09-05 | End: 2018-02-20 | Stop reason: ALTCHOICE

## 2017-09-05 RX ORDER — ACETAMINOPHEN 325 MG/1
650 TABLET ORAL EVERY 6 HOURS PRN
Qty: 30 TABLET | Refills: 0 | Status: SHIPPED | OUTPATIENT
Start: 2017-09-05

## 2017-09-05 RX ORDER — OXYCODONE HYDROCHLORIDE 5 MG/1
2.5 TABLET ORAL EVERY 4 HOURS PRN
Qty: 15 TABLET | Refills: 0 | Status: SHIPPED | OUTPATIENT
Start: 2017-09-05 | End: 2017-09-15

## 2017-09-05 RX ORDER — SENNOSIDES 8.6 MG
1 TABLET ORAL
Qty: 30 EACH | Refills: 0 | Status: SHIPPED | OUTPATIENT
Start: 2017-09-05

## 2017-09-05 RX ORDER — DOCUSATE SODIUM 100 MG/1
100 CAPSULE, LIQUID FILLED ORAL 2 TIMES DAILY
Qty: 10 CAPSULE | Refills: 0 | Status: SHIPPED | OUTPATIENT
Start: 2017-09-05

## 2017-09-05 RX ADMIN — ACETAMINOPHEN 650 MG: 325 TABLET ORAL at 11:44

## 2017-09-05 RX ADMIN — LIDOCAINE 1 PATCH: 50 PATCH CUTANEOUS at 11:44

## 2017-09-05 RX ADMIN — DOCUSATE SODIUM 100 MG: 100 CAPSULE, LIQUID FILLED ORAL at 09:10

## 2017-09-05 RX ADMIN — DOCUSATE SODIUM 100 MG: 100 CAPSULE, LIQUID FILLED ORAL at 17:46

## 2017-09-05 RX ADMIN — MODAFINIL 200 MG: 100 TABLET ORAL at 09:16

## 2017-09-05 RX ADMIN — METHOCARBAMOL 500 MG: 500 TABLET ORAL at 17:46

## 2017-09-05 RX ADMIN — OXYCODONE HYDROCHLORIDE 2.5 MG: 5 TABLET ORAL at 14:02

## 2017-09-05 RX ADMIN — LEVOTHYROXINE SODIUM 75 MCG: 75 TABLET ORAL at 05:40

## 2017-09-05 RX ADMIN — FLUTICASONE PROPIONATE 2 PUFF: 44 AEROSOL, METERED RESPIRATORY (INHALATION) at 17:46

## 2017-09-05 RX ADMIN — ACETAMINOPHEN 650 MG: 325 TABLET ORAL at 17:45

## 2017-09-05 RX ADMIN — ENOXAPARIN SODIUM 40 MG: 40 INJECTION SUBCUTANEOUS at 09:10

## 2017-09-05 RX ADMIN — ACETAMINOPHEN 650 MG: 325 TABLET ORAL at 05:40

## 2017-09-05 RX ADMIN — METHOCARBAMOL 500 MG: 500 TABLET ORAL at 11:44

## 2017-09-05 RX ADMIN — AMOXICILLIN AND CLAVULANATE POTASSIUM 1 TABLET: 875; 125 TABLET, FILM COATED ORAL at 09:10

## 2017-09-05 RX ADMIN — VITAMIN D, TAB 1000IU (100/BT) 1000 UNITS: 25 TAB at 09:10

## 2017-09-05 RX ADMIN — Medication 1 TABLET: at 09:10

## 2017-09-05 RX ADMIN — FLUTICASONE PROPIONATE 2 PUFF: 44 AEROSOL, METERED RESPIRATORY (INHALATION) at 09:10

## 2017-09-05 RX ADMIN — METHOCARBAMOL 500 MG: 500 TABLET ORAL at 05:40

## 2017-09-06 ENCOUNTER — GENERIC CONVERSION - ENCOUNTER (OUTPATIENT)
Dept: OTHER | Facility: OTHER | Age: 62
End: 2017-09-06

## 2017-09-08 ENCOUNTER — HOSPITAL ENCOUNTER (OUTPATIENT)
Dept: CT IMAGING | Facility: HOSPITAL | Age: 62
Discharge: HOME | End: 2017-09-08
Attending: NEUROLOGICAL SURGERY
Payer: COMMERCIAL

## 2017-09-08 DIAGNOSIS — S06.5X9A SUBDURAL HEMATOMA, ACUTE (HCC): ICD-10-CM

## 2017-09-08 DIAGNOSIS — I60.9 SAH (SUBARACHNOID HEMORRHAGE) (HCC): ICD-10-CM

## 2017-09-08 PROCEDURE — 70450 CT HEAD/BRAIN W/O DYE: CPT

## 2017-09-11 ENCOUNTER — GENERIC CONVERSION - ENCOUNTER (OUTPATIENT)
Dept: OTHER | Facility: OTHER | Age: 62
End: 2017-09-11

## 2017-09-22 ENCOUNTER — APPOINTMENT (OUTPATIENT)
Dept: RADIOLOGY | Facility: MEDICAL CENTER | Age: 62
End: 2017-09-22
Payer: COMMERCIAL

## 2017-09-22 DIAGNOSIS — S42.125D: ICD-10-CM

## 2017-09-22 PROCEDURE — 73030 X-RAY EXAM OF SHOULDER: CPT

## 2017-09-29 ENCOUNTER — ALLSCRIPTS OFFICE VISIT (OUTPATIENT)
Dept: OTHER | Facility: OTHER | Age: 62
End: 2017-09-29

## 2017-10-04 ENCOUNTER — TRANSCRIBE ORDERS (OUTPATIENT)
Dept: LAB | Facility: OTHER | Age: 62
End: 2017-10-04

## 2017-10-04 ENCOUNTER — APPOINTMENT (OUTPATIENT)
Dept: LAB | Facility: OTHER | Age: 62
End: 2017-10-04
Payer: COMMERCIAL

## 2017-10-04 DIAGNOSIS — E78.5 HYPERLIPIDEMIA: ICD-10-CM

## 2017-10-04 DIAGNOSIS — E03.9 HYPOTHYROIDISM: ICD-10-CM

## 2017-10-04 LAB
ALBUMIN SERPL BCP-MCNC: 3.5 G/DL (ref 3.5–5)
ALP SERPL-CCNC: 119 U/L (ref 46–116)
ALT SERPL W P-5'-P-CCNC: 16 U/L (ref 12–78)
ANION GAP SERPL CALCULATED.3IONS-SCNC: 9 MMOL/L (ref 4–13)
AST SERPL W P-5'-P-CCNC: 11 U/L (ref 5–45)
BASOPHILS # BLD AUTO: 0.05 THOUSANDS/ΜL (ref 0–0.1)
BASOPHILS NFR BLD AUTO: 1 % (ref 0–1)
BILIRUB SERPL-MCNC: 0.46 MG/DL (ref 0.2–1)
BUN SERPL-MCNC: 13 MG/DL (ref 5–25)
CALCIUM SERPL-MCNC: 9.3 MG/DL (ref 8.3–10.1)
CHLORIDE SERPL-SCNC: 106 MMOL/L (ref 100–108)
CHOLEST SERPL-MCNC: 228 MG/DL (ref 50–200)
CO2 SERPL-SCNC: 27 MMOL/L (ref 21–32)
CREAT SERPL-MCNC: 0.61 MG/DL (ref 0.6–1.3)
EOSINOPHIL # BLD AUTO: 0.14 THOUSAND/ΜL (ref 0–0.61)
EOSINOPHIL NFR BLD AUTO: 3 % (ref 0–6)
ERYTHROCYTE [DISTWIDTH] IN BLOOD BY AUTOMATED COUNT: 13.1 % (ref 11.6–15.1)
GFR SERPL CREATININE-BSD FRML MDRD: 96 ML/MIN/1.73SQ M
GLUCOSE P FAST SERPL-MCNC: 89 MG/DL (ref 65–99)
HCT VFR BLD AUTO: 41.6 % (ref 34.8–46.1)
HDLC SERPL-MCNC: 96 MG/DL (ref 40–60)
HGB BLD-MCNC: 13.8 G/DL (ref 11.5–15.4)
LDLC SERPL CALC-MCNC: 123 MG/DL (ref 0–100)
LYMPHOCYTES # BLD AUTO: 1.98 THOUSANDS/ΜL (ref 0.6–4.47)
LYMPHOCYTES NFR BLD AUTO: 36 % (ref 14–44)
MCH RBC QN AUTO: 32.5 PG (ref 26.8–34.3)
MCHC RBC AUTO-ENTMCNC: 33.2 G/DL (ref 31.4–37.4)
MCV RBC AUTO: 98 FL (ref 82–98)
MONOCYTES # BLD AUTO: 0.42 THOUSAND/ΜL (ref 0.17–1.22)
MONOCYTES NFR BLD AUTO: 8 % (ref 4–12)
NEUTROPHILS # BLD AUTO: 2.85 THOUSANDS/ΜL (ref 1.85–7.62)
NEUTS SEG NFR BLD AUTO: 52 % (ref 43–75)
NRBC BLD AUTO-RTO: 0 /100 WBCS
PLATELET # BLD AUTO: 370 THOUSANDS/UL (ref 149–390)
PMV BLD AUTO: 9.5 FL (ref 8.9–12.7)
POTASSIUM SERPL-SCNC: 4 MMOL/L (ref 3.5–5.3)
PROT SERPL-MCNC: 6.9 G/DL (ref 6.4–8.2)
RBC # BLD AUTO: 4.25 MILLION/UL (ref 3.81–5.12)
SODIUM SERPL-SCNC: 142 MMOL/L (ref 136–145)
TRIGL SERPL-MCNC: 45 MG/DL
TSH SERPL DL<=0.05 MIU/L-ACNC: 2.28 UIU/ML (ref 0.36–3.74)
WBC # BLD AUTO: 5.45 THOUSAND/UL (ref 4.31–10.16)

## 2017-10-04 PROCEDURE — 80061 LIPID PANEL: CPT

## 2017-10-04 PROCEDURE — 36415 COLL VENOUS BLD VENIPUNCTURE: CPT

## 2017-10-04 PROCEDURE — 84443 ASSAY THYROID STIM HORMONE: CPT

## 2017-10-04 PROCEDURE — 85025 COMPLETE CBC W/AUTO DIFF WBC: CPT

## 2017-10-04 PROCEDURE — 80053 COMPREHEN METABOLIC PANEL: CPT

## 2017-10-09 ENCOUNTER — GENERIC CONVERSION - ENCOUNTER (OUTPATIENT)
Dept: OTHER | Facility: OTHER | Age: 62
End: 2017-10-09

## 2017-10-09 DIAGNOSIS — S22.49XA MULTIPLE CLOSED FRACTURES OF RIBS: ICD-10-CM

## 2017-10-18 ENCOUNTER — GENERIC CONVERSION - ENCOUNTER (OUTPATIENT)
Dept: OTHER | Facility: OTHER | Age: 62
End: 2017-10-18

## 2017-11-08 ENCOUNTER — GENERIC CONVERSION - ENCOUNTER (OUTPATIENT)
Dept: OTHER | Facility: OTHER | Age: 62
End: 2017-11-08

## 2017-11-17 ENCOUNTER — ALLSCRIPTS OFFICE VISIT (OUTPATIENT)
Dept: OTHER | Facility: OTHER | Age: 62
End: 2017-11-17

## 2017-11-17 DIAGNOSIS — S22.49XA MULTIPLE CLOSED FRACTURES OF RIBS: ICD-10-CM

## 2017-11-22 ENCOUNTER — HOSPITAL ENCOUNTER (OUTPATIENT)
Dept: RADIOLOGY | Facility: HOSPITAL | Age: 62
Discharge: HOME | End: 2017-11-22
Payer: COMMERCIAL

## 2017-11-22 ENCOUNTER — TRANSCRIBE ORDERS (OUTPATIENT)
Dept: ADMINISTRATIVE | Facility: HOSPITAL | Age: 62
End: 2017-11-22

## 2017-11-22 DIAGNOSIS — S22.49XA MULTIPLE CLOSED FRACTURES OF RIBS: ICD-10-CM

## 2017-11-22 PROCEDURE — 71101 X-RAY EXAM UNILAT RIBS/CHEST: CPT

## 2017-12-14 ENCOUNTER — GENERIC CONVERSION - ENCOUNTER (OUTPATIENT)
Dept: OTHER | Facility: OTHER | Age: 62
End: 2017-12-14

## 2018-01-13 VITALS
HEIGHT: 62 IN | SYSTOLIC BLOOD PRESSURE: 130 MMHG | DIASTOLIC BLOOD PRESSURE: 80 MMHG | BODY MASS INDEX: 22.29 KG/M2 | WEIGHT: 121.13 LBS | OXYGEN SATURATION: 95 % | HEART RATE: 92 BPM

## 2018-01-13 VITALS
HEART RATE: 62 BPM | OXYGEN SATURATION: 93 % | DIASTOLIC BLOOD PRESSURE: 84 MMHG | HEIGHT: 62 IN | WEIGHT: 120.25 LBS | SYSTOLIC BLOOD PRESSURE: 116 MMHG | BODY MASS INDEX: 22.13 KG/M2

## 2018-01-14 VITALS
WEIGHT: 111.5 LBS | DIASTOLIC BLOOD PRESSURE: 82 MMHG | OXYGEN SATURATION: 97 % | HEART RATE: 105 BPM | SYSTOLIC BLOOD PRESSURE: 132 MMHG | BODY MASS INDEX: 20.52 KG/M2 | HEIGHT: 62 IN

## 2018-01-15 NOTE — PROGRESS NOTES
Assessment    1  Closed fracture of multiple ribs of left side, initial encounter (807 09) (S22 42XA)   2  Traumatic pneumothorax, initial encounter (860 0) (S27 0XXA)    Discussion/Summary   patient does not require any further trauma evaluation at this time  Patient is from the trauma service  Patient is told to continue using her incentive spirometry and continue working with physical therapy  Patient will be discharged from [address] [place] [address] till this Sunday  Patient was told to call the trauma office with any questions or concerns  Patient was told to return to the ER with any increased shortness of breath or chest pain  Patient was not given any scripts at this visit  Patient was told to follow up with Orthopedics with any questions regarding her acromion process injury  Patient was also told to follow up with Neurosurgery with any questions regarding her head bleed  Self Referrals:   Self Referrals: No Seen acutely in Ed  Chief Complaint  Chief Complaint Free Text Note Form: f/u fall  History of Present Illness  HPI: Patient is a 68-year-old female who comes to the trauma office for re-evaluation of her rib fractures in traumatic hemo/pneumothorax  Patient reports since she has been discharged said she is doing much better  Patient reports that her shortness of breath and chest pain is almost all gone  She reports that she is not taking any pain management for last 3 days  Patient reports that she continues to use her incentive spirometer and that she is at 1500  Patient reports that she has had no issues with ambulating other than improving her strength and conditioning with physical therapy  Patient reports that she does not get tired or dyspneic on exertion with physical therapy  Patient reports that she is doing much better and happy that everything turned out well  Patient denies any fevers, chills, sweats  Patient denies any nausea or vomiting  Patient denies any issues with p  o  intake  Patient denies any issues with bladder or bowel function  Review of Systems  Complete-Female:   Constitutional: No fever, no chills, feels well, no tiredness, no recent weight gain or weight loss  Eyes: No complaints of eye pain, no red eyes, no eyesight problems, no discharge, no dry eyes, no itching of eyes  ENT: no complaints of earache, no loss of hearing, no nose bleeds, no nasal discharge, no sore throat, no hoarseness  Cardiovascular: No complaints of slow heart rate, no fast heart rate, no chest pain, no palpitations, no leg claudication, no lower extremity edema  Respiratory: No complaints of shortness of breath, no wheezing, no cough, no SOB on exertion, no orthopnea, no PND  Gastrointestinal: No complaints of abdominal pain, no constipation, no nausea or vomiting, no diarrhea, no bloody stools  Genitourinary: No complaints of dysuria, no incontinence, no pelvic pain, no dysmenorrhea, no vaginal discharge or bleeding  Musculoskeletal: No complaints of arthralgias, no myalgias, no joint swelling or stiffness, no limb pain or swelling  Integumentary: No complaints of skin rash or lesions, no itching, no skin wounds, no breast pain or lump  Neurological: No complaints of headache, no confusion, no convulsions, no numbness, no dizziness or fainting, no tingling, no limb weakness, no difficulty walking  Psychiatric: Not suicidal, no sleep disturbance, no anxiety or depression, no change in personality, no emotional problems  Endocrine: No complaints of proptosis, no hot flashes, no muscle weakness, no deepening of the voice, no feelings of weakness  Hematologic/Lymphatic: No complaints of swollen glands, no swollen glands in the neck, does not bleed easily, does not bruise easily  ROS Reviewed:   ROS reviewed  Active Problems    1  Acute bronchitis (466 0) (J20 9)   2  Ambulatory dysfunction (719 7) (R26 2)   3  Asthma (493 90) (J45 909)   4   Closed fracture of left zygomatic arch, initial encounter (802 4) (S02 40FA)   5  Closed fracture of multiple ribs of left side, initial encounter (807 09) (S22 42XA)   6  Closed fracture of parietal bone of skull with routine healing, subsequent encounter   (V54 19) (S02  0XXD)   7  Closed fracture of temporal bone, initial encounter (801 00) (S02 19XA)   8  Closed nondisplaced fracture of left acromial process with routine healing (V54 11)   (S42 125D)   9  Dry scalp (701 1) (R23 8)   10  History of subarachnoid hemorrhage (V12 59) (Z86 79)   11  History of subdural hematoma (post traumatic) (V12 59) (Z87 828)   12  Hyperlipidemia (272 4) (E78 5)   13  Hypothyroidism (244 9) (E03 9)   14  Mental and behavioral problems with communication (including speech) (V40 1) (F80 9)   15  Mild persistent asthma (493 90) (J45 30)   16  Need for influenza vaccination (V04 81) (Z23)   17  SEBASTIÁN (obstructive sleep apnea) (327 23) (G47 33)   18  SEBASTIÁN on CPAP (327 23,V46 8) (G47 33,Z99 89)   19  Osteoarthrosis (715 90) (M19 90)   20  Osteopenia (733 90) (M85 80)   21  Physical deconditioning (799 3) (R53 81)   22  SAH (subarachnoid hemorrhage) (430) (I60 9)   23  Sleep apnea (780 57) (G47 30)   24  Subdural hematoma (432 1) (I62 00)   25  Traumatic pneumothorax, initial encounter (860 0) (S27 0XXA)    Past Medical History    1  History of Chronic bronchitis (491 9) (J42)   2  History of Fall, initial encounter (E888 9) [facility])   3  History of shortness of breath (V13 89) (Z87 898)   4  History of Screening for diabetes mellitus (DM) (V77 1) (Z13 1)   5  History of Screening for lipoid disorders (V77 91) (D17 910)    Surgical History    1  Denied: History of Recent Surgery  Surgical History Reviewed: The surgical history was reviewed and updated today  Family History  Mother    1  Family history of rheumatoid arthritis (V17 7) (Z82 61)  Father    2  Family history of Congenital heart failure   3   Family history of diabetes mellitus (V18 0) (Z83 3)  Family History Reviewed: The family history was reviewed and updated today  Social History    · Never a smoker   · No drug use   · Retired   · [facility]   · Social alcohol use (Z78 9)  Social History Reviewed: The social history was reviewed and updated today  Current Meds   1  Acetaminophen 325 MG Oral Tablet; TAKE 1 TO 2 TABLETS EVERY 6 HOURS AS   NEEDED; Therapy: (Recorded:48Kqu5812) to Recorded   2  Colace 100 MG Oral Capsule; TAKE 1 CAPSULE TWICE DAILY AS NEEDED; Therapy: (Recorded:19Puz4618) to Recorded   3  Flovent HFA 44 MCG/ACT Inhalation Aerosol; USE 2 INHALATIONS TWICE A DAY; Therapy: 83Xpn5646 to (Bryan Fernando)  Requested for: 87NHX7919; Last   Rx:11Upc8708 Ordered   4  Levothyroxine Sodium 75 MCG Oral Tablet; Take 1 tablet daily; Therapy: 57VPU8793 to (Last Rx:73Jmq7889)  Requested for: 10Apr2017 Ordered   5  Lovenox 40 MG/0 4ML Subcutaneous Solution; INJECT ML Twice daily 2 puffs; Therapy: (Noahre Burner) to Recorded   6  Methocarbamol 500 MG Oral Tablet; TAKE 1 TABLET Every 6 hours; Therapy: (Dierdre Burner) to Recorded   7  Modafinil 200 MG Oral Tablet; TAKE 1 TABLET DAILY; Therapy: 84Joa9453 to (Evaluate:11Nov2017); Last Rx:10Slb4266 Ordered   8  OxyCODONE HCl - 5 MG Oral Tablet; TAKE 1 TABLET EVERY 4 HOURS AS NEEDED   FOR PAIN;   Therapy: (Recorded:20Sep2017) to Recorded   9  Proventil  (90 Base) MCG/ACT Inhalation Aerosol Solution; INHALE 2 PUFFS   Every 6 hours PRN; Therapy: 90RHN5910 to (Renew:20Apr2018)  Requested for: 25Apr2017; Last   Rx:25Apr2017 Ordered   10  Senokot 8 6 MG Oral Tablet; Take 1 tablet daily; Therapy: (Recorded:85Bon4661) to Recorded   11  TraMADol HCl - 50 MG Oral Tablet; Therapy: (Recorded:47Kzt1032) to Recorded  Medication List Reviewed: The medication list was reviewed and updated today  Allergies    1   No Known Drug Allergies    Vitals  Signs   Recorded: 04SGK7577 26:91AR   Systolic: 738  Diastolic: 76  Height Unobtainable: Yes  Weight Unobtainable: Yes    Physical Exam    Constitutional   General appearance: No acute distress, well appearing and well nourished  Eyes   Conjunctiva and lids: No swelling, erythema or discharge  Pupils and irises: Equal, round and reactive to light  Sclera non-icteric  Ears, Nose, Mouth, and Throat   External inspection of ears and nose: Normal     Nasal mucosa, septum, and turbinates: Normal without edema or erythema  Neck   Supple, symmetric, trachea midline, no masses   Pulmonary   Respiratory effort: No increased work of breathing or signs of respiratory distress  Saturation at 97% on room air  Auscultation of lungs: Clear to auscultation, equal breath sounds bilaterally, no wheezes, no rales, no rhonci  Cardiovascular   Auscultation of heart: Normal rate and rhythm, normal S1 and S2, without murmurs  Carotid pulses: Normal     Abdomen   Abdomen: Non-tender, no masses  Liver and spleen: No hepatomegaly or splenomegaly  Lymphatic   Palpation of lymph nodes in neck: No lymphadenopathy  Musculoskeletal   Gait and station: Normal     Inspection/palpation of joints, bones, and muscles: Normal     Skin   Skin and subcutaneous tissue: Normal without rashes or lesions  Neurologic   Cranial nerves: Cranial nerves 2-12 intact  Sensation: Motor and sensory grossly intact  Psychiatric   Orientation to person, place, and time: Normal        Future Appointments    Date/Time Provider Specialty Site   12/14/2017 11:15 AM OLAMIDE Zayas   Pulmonary Medicine [address]     Signatures  Electronically signed by : Ajay Quintana [facility]; Sep 29 2017  1:39PM EST                       (Author)

## 2018-01-18 NOTE — MISCELLANEOUS
History of Present Illness  TCM Communication [facility]: The patient is being contacted for follow-up after hospitalization and discharged to skilled nursing facility-[facility], Adelia Carnes   no call made  She was hospitalized at [facility]  The date of admission: 8/27, date of discharge: 9/5  Diagnosis: subdural hematoma,closed facial fractures, rib fractures,pneumothorax  She was discharged to a rehabilitation center  Communication performed and completed by eddie sams      Active Problems    1  Acute bronchitis (466 0) (J20 9)   2  Asthma (493 90) (J45 909)   3  Dry scalp (701 1) (R23 8)   4  Hyperlipidemia (272 4) (E78 5)   5  Hypothyroidism (244 9) (E03 9)   6  Mental and behavioral problems with communication (including speech) (V40 1) (F80 9)   7  Mild persistent asthma (493 90) (J45 30)   8  Need for influenza vaccination (V04 81) (Z23)   9  SEBASTIÁN on CPAP (327 23,V46 8) (G47 33,Z99 89)   10  Osteoarthrosis (715 90) (M19 90)   11  Osteopenia (733 90) (M85 80)   12  Sleep apnea (780 57) (G47 30)    Past Medical History    1  History of Chronic bronchitis (491 9) (J42)   2  History of shortness of breath (V13 89) (Z87 898)   3  History of Screening for diabetes mellitus (DM) (V77 1) (Z13 1)   4  History of Screening for lipoid disorders (V77 91) (J19 301)    Surgical History    1  Denied: History of Recent Surgery    Family History  Mother    1  Family history of rheumatoid arthritis (V17 7) (Z82 61)  Father    2  Family history of Congenital heart failure   3  Family history of diabetes mellitus (V18 0) (Z83 3)    Social History    · Alcohol use   · Never a smoker   · No drug use   · Retired    Current Meds   1  Aspirin 81 MG TABS; Take 1 tablet daily Recorded   2  Betamethasone Valerate 0 12 % External Foam; APPLY TO AFFECTED AREAS AS   DIRECTED; Therapy: 14TNB0696 to (Last Rx:25Apr2017)  Requested for: 25Apr2017 Ordered   3  Claritin 10 MG Oral Tablet; take 1 tablet daily as needed Recorded   4  Clobetasol Propionate 0 05 % External Shampoo; SHAMPOO HAIR/SCALP AS   DIRECTED; Therapy: 02YWL3753 to (Milana Scrape)  Requested for: 44KQY0066; Last   Rx:49Pwm4506 Ordered   5  [facility] Vitamin D 2000 UNIT CAPS Recorded   6  Flovent HFA 44 MCG/ACT Inhalation Aerosol; USE 2 INHALATIONS TWICE A DAY; Therapy: 44Lzo2107 to (Marlinda Crystal)  Requested for: 77HSR4321; Last   Rx:39Ixy3064 Ordered   7  Levothyroxine Sodium 75 MCG Oral Tablet; Take 1 tablet daily; Therapy: 96FVD3975 to (Last Rx:19Nqe8475)  Requested for: 31Mwz7301 Ordered   8  Modafinil 200 MG Oral Tablet; TAKE 1 TABLET DAILY; Therapy: 92Bxm2478 to (Evaluate:12Jil0663); Last Rx:38Wyv3627 Ordered   9  Proventil  (90 Base) MCG/ACT Inhalation Aerosol Solution; INHALE 2 PUFFS   Every 6 hours PRN; Therapy: 21BEE8549 to (Renew:95Cim4954)  Requested for: 08Cyx8847; Last   Rx:24Cpi3597 Ordered   10  Provigil 200 MG Oral Tablet (Modafinil); 1 1/2 QD Recorded    Allergies    1  No Known Drug Allergies    Future Appointments    Date/Time Provider Specialty Site   12/14/2017 11:15 AM OLAMIDE Russell  Pulmonary Medicine [facility] [place]   09/11/2017 11:00 AM Ranjith Lafleur, [address]   09/13/2017 02:15 PM OLAMIDE Subramanian   Internal Medicine [facility]     Signatures   Electronically signed by : Ray Blackman [facility]; Sep  6 2017 11:09AM EST                       (Author)    Electronically signed by : OLAMIDE Herrera ; Sep 11 2017  8:08AM EST

## 2018-01-22 VITALS
RESPIRATION RATE: 14 BRPM | DIASTOLIC BLOOD PRESSURE: 84 MMHG | HEART RATE: 92 BPM | WEIGHT: 112.5 LBS | HEIGHT: 62 IN | BODY MASS INDEX: 20.7 KG/M2 | SYSTOLIC BLOOD PRESSURE: 148 MMHG

## 2018-01-22 VITALS
TEMPERATURE: 98.8 F | DIASTOLIC BLOOD PRESSURE: 70 MMHG | SYSTOLIC BLOOD PRESSURE: 135 MMHG | RESPIRATION RATE: 14 BRPM | HEART RATE: 95 BPM

## 2018-01-22 VITALS — SYSTOLIC BLOOD PRESSURE: 138 MMHG | DIASTOLIC BLOOD PRESSURE: 76 MMHG

## 2018-01-24 VITALS
HEART RATE: 88 BPM | DIASTOLIC BLOOD PRESSURE: 90 MMHG | RESPIRATION RATE: 14 BRPM | SYSTOLIC BLOOD PRESSURE: 136 MMHG | WEIGHT: 111.25 LBS | BODY MASS INDEX: 20.47 KG/M2 | HEIGHT: 62 IN

## 2018-01-24 VITALS
OXYGEN SATURATION: 93 % | DIASTOLIC BLOOD PRESSURE: 80 MMHG | BODY MASS INDEX: 20.45 KG/M2 | WEIGHT: 111.13 LBS | SYSTOLIC BLOOD PRESSURE: 124 MMHG | HEART RATE: 91 BPM | HEIGHT: 62 IN

## 2018-02-09 DIAGNOSIS — E78.5 HYPERLIPIDEMIA: ICD-10-CM

## 2018-02-09 DIAGNOSIS — E03.9 HYPOTHYROIDISM: ICD-10-CM

## 2018-02-15 ENCOUNTER — APPOINTMENT (OUTPATIENT)
Dept: LAB | Facility: CLINIC | Age: 63
End: 2018-02-15
Payer: COMMERCIAL

## 2018-02-15 ENCOUNTER — HOSPITAL ENCOUNTER (EMERGENCY)
Facility: HOSPITAL | Age: 63
Discharge: HOME | End: 2018-02-15
Attending: EMERGENCY MEDICINE | Admitting: EMERGENCY MEDICINE
Payer: COMMERCIAL

## 2018-02-15 VITALS
SYSTOLIC BLOOD PRESSURE: 154 MMHG | WEIGHT: 111 LBS | HEART RATE: 82 BPM | OXYGEN SATURATION: 96 % | BODY MASS INDEX: 19.67 KG/M2 | TEMPERATURE: 98.7 F | DIASTOLIC BLOOD PRESSURE: 80 MMHG | RESPIRATION RATE: 18 BRPM | HEIGHT: 63 IN
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
## 2018-02-15 DIAGNOSIS — E78.5 HYPERLIPIDEMIA: ICD-10-CM

## 2018-02-15 DIAGNOSIS — E03.9 HYPOTHYROIDISM: ICD-10-CM

## 2018-02-15 LAB
ALBUMIN SERPL BCP-MCNC: 3.6 G/DL (ref 3.5–5)
ALP SERPL-CCNC: 84 U/L (ref 46–116)
ALT SERPL W P-5'-P-CCNC: 19 U/L (ref 12–78)
ANION GAP SERPL CALCULATED.3IONS-SCNC: 8 MMOL/L (ref 4–13)
AST SERPL W P-5'-P-CCNC: 12 U/L (ref 5–45)
BASOPHILS # BLD AUTO: 0.03 THOUSANDS/ΜL (ref 0–0.1)
BASOPHILS NFR BLD AUTO: 1 % (ref 0–1)
BILIRUB SERPL-MCNC: 1.08 MG/DL (ref 0.2–1)
BUN SERPL-MCNC: 17 MG/DL (ref 5–25)
CALCIUM SERPL-MCNC: 8.8 MG/DL (ref 8.3–10.1)
CHLORIDE SERPL-SCNC: 109 MMOL/L (ref 100–108)
CHOLEST SERPL-MCNC: 220 MG/DL (ref 50–200)
CO2 SERPL-SCNC: 25 MMOL/L (ref 21–32)
CREAT SERPL-MCNC: 0.63 MG/DL (ref 0.6–1.3)
EOSINOPHIL # BLD AUTO: 0.07 THOUSAND/ΜL (ref 0–0.61)
EOSINOPHIL NFR BLD AUTO: 1 % (ref 0–6)
ERYTHROCYTE [DISTWIDTH] IN BLOOD BY AUTOMATED COUNT: 13.3 % (ref 11.6–15.1)
GFR SERPL CREATININE-BSD FRML MDRD: 94 ML/MIN/1.73SQ M
GLUCOSE P FAST SERPL-MCNC: 106 MG/DL (ref 65–99)
HCT VFR BLD AUTO: 42.9 % (ref 34.8–46.1)
HDLC SERPL-MCNC: 89 MG/DL (ref 40–60)
HGB BLD-MCNC: 14.3 G/DL (ref 11.5–15.4)
LDLC SERPL CALC-MCNC: 120 MG/DL (ref 0–100)
LYMPHOCYTES # BLD AUTO: 1.58 THOUSANDS/ΜL (ref 0.6–4.47)
LYMPHOCYTES NFR BLD AUTO: 29 % (ref 14–44)
MCH RBC QN AUTO: 31.7 PG (ref 26.8–34.3)
MCHC RBC AUTO-ENTMCNC: 33.3 G/DL (ref 31.4–37.4)
MCV RBC AUTO: 95 FL (ref 82–98)
MONOCYTES # BLD AUTO: 0.43 THOUSAND/ΜL (ref 0.17–1.22)
MONOCYTES NFR BLD AUTO: 8 % (ref 4–12)
NEUTROPHILS # BLD AUTO: 3.26 THOUSANDS/ΜL (ref 1.85–7.62)
NEUTS SEG NFR BLD AUTO: 61 % (ref 43–75)
NRBC BLD AUTO-RTO: 0 /100 WBCS
PLATELET # BLD AUTO: 342 THOUSANDS/UL (ref 149–390)
PMV BLD AUTO: 10.1 FL (ref 8.9–12.7)
POTASSIUM SERPL-SCNC: 3.6 MMOL/L (ref 3.5–5.3)
PROT SERPL-MCNC: 7.1 G/DL (ref 6.4–8.2)
RBC # BLD AUTO: 4.51 MILLION/UL (ref 3.81–5.12)
SODIUM SERPL-SCNC: 142 MMOL/L (ref 136–145)
TRIGL SERPL-MCNC: 55 MG/DL
TSH SERPL DL<=0.05 MIU/L-ACNC: 0.96 UIU/ML (ref 0.36–3.74)
WBC # BLD AUTO: 5.38 THOUSAND/UL (ref 4.31–10.16)

## 2018-02-15 PROCEDURE — 90715 TDAP VACCINE 7 YRS/> IM: CPT | Performed by: EMERGENCY MEDICINE

## 2018-02-15 PROCEDURE — 90471 IMMUNIZATION ADMIN: CPT

## 2018-02-15 PROCEDURE — 84443 ASSAY THYROID STIM HORMONE: CPT

## 2018-02-15 PROCEDURE — 80061 LIPID PANEL: CPT

## 2018-02-15 PROCEDURE — 36415 COLL VENOUS BLD VENIPUNCTURE: CPT

## 2018-02-15 PROCEDURE — 80053 COMPREHEN METABOLIC PANEL: CPT

## 2018-02-15 PROCEDURE — 85025 COMPLETE CBC W/AUTO DIFF WBC: CPT

## 2018-02-15 PROCEDURE — 99283 EMERGENCY DEPT VISIT LOW MDM: CPT

## 2018-02-15 RX ORDER — AMOXICILLIN AND CLAVULANATE POTASSIUM 875; 125 MG/1; MG/1
1 TABLET, FILM COATED ORAL ONCE
Status: COMPLETED | OUTPATIENT
Start: 2018-02-15 | End: 2018-02-15

## 2018-02-15 RX ORDER — AMOXICILLIN AND CLAVULANATE POTASSIUM 875; 125 MG/1; MG/1
1 TABLET, FILM COATED ORAL EVERY 12 HOURS
Qty: 14 TABLET | Refills: 0 | Status: SHIPPED | OUTPATIENT
Start: 2018-02-15 | End: 2018-02-22

## 2018-02-15 RX ADMIN — TETANUS TOXOID, REDUCED DIPHTHERIA TOXOID AND ACELLULAR PERTUSSIS VACCINE, ADSORBED 0.5 ML: 5; 2.5; 8; 8; 2.5 SUSPENSION INTRAMUSCULAR at 21:50

## 2018-02-15 RX ADMIN — AMOXICILLIN AND CLAVULANATE POTASSIUM 1 TABLET: 875; 125 TABLET, FILM COATED ORAL at 21:50

## 2018-02-16 NOTE — DISCHARGE INSTRUCTIONS
Animal Bite   WHAT YOU NEED TO KNOW:   Animal bite injuries range from shallow cuts to deep, life-threatening wounds  An animal can cut or puncture the skin when it bites  Your skin may be torn from your body  Your skin may swell or bruise even if the bite does not break the skin  Animal bites occur more often on the hands, arms, legs, and face  Bites from dogs and cats are the most common injuries  DISCHARGE INSTRUCTIONS:   Seek care immediately if:   · You have a fever  · Your wound is red, swollen, and draining pus  · You see red streaks on the skin around the wound  · You can no longer move the bitten area  · Your heartbeat and breathing are much faster than usual     · You feel dizzy and confused  Contact your healthcare provider if:   · Your pain does not get better, even after you take pain medicine  · You have nightmares or flashbacks about the animal bite  · You have questions or concerns about your condition or care  Medicines: You may need any of the following:  · Antibiotics  prevent or treat a bacterial infection  · Prescription pain medicine  may be given  Ask how to take this medicine safely  · A tetanus vaccine  may be needed to prevent tetanus  Tetanus is a life-threatening bacterial infection that affects the nerves and muscles  The bacteria can be spread through animal bites  · A rabies vaccine  may be needed to prevent rabies  Rabies is a life-threatening viral infection  The virus can be spread through animal bites  · Take your medicine as directed  Contact your healthcare provider if you think your medicine is not helping or if you have side effects  Tell him or her if you are allergic to any medicine  Keep a list of the medicines, vitamins, and herbs you take  Include the amounts, and when and why you take them  Bring the list or the pill bottles to follow-up visits  Carry your medicine list with you in case of an emergency    Follow up with your healthcare provider in 1 to 2 days: You may need to return to have your stitches removed  Write down your questions so you remember to ask them during your visits  Self-care:   · Apply antibiotic ointment as directed  This helps prevent infection in minor skin wounds  It is available without a doctor's order  · Keep the wound clean and covered  Wash the wound every day with soap and water or germ-killing cleanser  Ask your healthcare provider about the kinds of bandages to use  · Apply ice on your wound  Ice helps decrease swelling and pain  Ice may also help prevent tissue damage  Use an ice pack, or put crushed ice in a plastic bag  Cover it with a towel and place it on your wound for 15 to 20 minutes every hour or as directed  · Elevate the wound area  Raise your wound above the level of your heart as often as you can  This will help decrease swelling and pain  Prop your wound on pillows or blankets to keep it elevated comfortably  Prevent another animal bite:   · Learn to recognize the signs of a scared or angry pet  Avoid quick, sudden movements  · Do not step between animals that are fighting  · Do not leave a pet alone with a young child  · Do not disturb an animal while it eats, sleeps, or cares for its young  · Do not approach an animal you do not know, especially one that is tied up or caged  · Stay away from animals that seem sick or act strangely  · Do not feed or capture wild animals  © 2017 [address] Information is for End User's use only and may not be sold, redistributed or otherwise used for commercial purposes  All illustrations and images included in CareNotes® are the copyrighted property of [employer]  or Maxim Ochoa  The above information is an  only  It is not intended as medical advice for individual conditions or treatments   Talk to your doctor, nurse or pharmacist before following any medical regimen to see if it is safe and effective for you

## 2018-02-16 NOTE — ED PROVIDER NOTES
History  Chief Complaint   Patient presents with    Cat Bite     pt has a tenant living in her basement that's cat bit her right hand and arm today after she tried to get the cat away from her dog that that trying to go after it - unsure if cat is UTD on vaccines     Patient bit by a cat, right hand  Multiple bites  This was today  She has some mild nonradiating pain at the bite area  No f/c/s/n/v/d  She washed it out with soap and water  Unknown vaccine status  Cat lives inside most of the time but does go outside  Cat bit her because the cat was scared of the dog  This was clearly provoked  Will defer from treating for rabies  The patient (and any family present) verbalized understanding of the discharge instructions and warnings that would necessitate return to the Emergency Department  Gave verbal in addition to written discharge instructions  Specifically highlighted areas of special concern regarding the written and verbal discharge instructions and return precautions  All questions were answered prior to discharge  Prior to Admission Medications   Prescriptions Last Dose Informant Patient Reported?  Taking?   acetaminophen (TYLENOL) 325 mg tablet   No No   Sig: Take 2 tablets by mouth every 6 (six) hours as needed for mild pain   docusate sodium (COLACE) 100 mg capsule   No No   Sig: Take 1 capsule by mouth 2 (two) times a day   enoxaparin (LOVENOX) 40 mg/0 4 mL   No No   Sig: Inject 0 4 mL under the skin every 24 hours for 28 days   fluticasone (FLOVENT HFA) 44 mcg/act inhaler   Yes No   Sig: Inhale 2 puffs 2 (two) times a day   levothyroxine 75 mcg tablet   Yes No   Sig: Take 75 mcg by mouth daily   methocarbamol (ROBAXIN) 500 mg tablet   No No   Sig: Take 0 5 tablets by mouth every 6 (six) hours   modafinil (PROVIGIL) 200 MG tablet   Yes No   Sig: Take 200 mg by mouth daily   senna (SENOKOT) 8 6 mg   No No   Sig: Take 1 tablet by mouth daily at bedtime Facility-Administered Medications: None       Past Medical History:   Diagnosis Date    Asthma     Disease of thyroid gland     Sleep apnea        History reviewed  No pertinent surgical history  History reviewed  No pertinent family history  I have reviewed and agree with the history as documented  Social History   Substance Use Topics    Smoking status: Never Smoker    Smokeless tobacco: Never Used    Alcohol use Yes      Comment: Occasionally        Review of Systems   Constitutional: Negative for chills and fever  Respiratory: Negative for shortness of breath  Gastrointestinal: Negative for abdominal pain  Musculoskeletal: Negative for arthralgias  Skin: Positive for wound  Negative for color change and pallor  All other systems reviewed and are negative  Physical Exam  ED Triage Vitals   Temperature Pulse Respirations Blood Pressure SpO2   02/15/18 1933 02/15/18 1933 02/15/18 1933 02/15/18 1933 02/15/18 1933   98 1 °F (36 7 °C) 72 18 (!) 172/92 97 %      Temp Source Heart Rate Source Patient Position - Orthostatic VS BP Location FiO2 (%)   02/15/18 1933 02/15/18 1933 02/15/18 1933 02/15/18 1933 --   Oral Monitor Sitting Left arm       Pain Score       02/15/18 1934       2           Orthostatic Vital Signs  Vitals:    02/15/18 1933 02/15/18 2049   BP: (!) 172/92 154/80   Pulse: 72 82   Patient Position - Orthostatic VS: Sitting Sitting       Physical Exam   Constitutional: She is oriented to person, place, and time  She appears well-developed and well-nourished  HENT:   Head: Normocephalic and atraumatic  Right Ear: External ear normal    Left Ear: External ear normal    Eyes: Conjunctivae and EOM are normal    Neck: Normal range of motion  Neck supple  No JVD present  No tracheal deviation present  Cardiovascular: Normal rate, regular rhythm and normal heart sounds  Pulmonary/Chest: Effort normal  No respiratory distress  She has no wheezes  She has no rales  Abdominal: Soft  Bowel sounds are normal  There is no tenderness  There is no rebound and no guarding  Musculoskeletal: She exhibits no edema or tenderness  Small cat bites to right hand   Neurological: She is alert and oriented to person, place, and time  Skin: Skin is warm and dry  No rash noted  No erythema  Psychiatric: She has a normal mood and affect  Thought content normal    Nursing note and vitals reviewed  ED Medications  Medications   amoxicillin-clavulanate (AUGMENTIN) 875-125 mg per tablet 1 tablet (1 tablet Oral Given 2/15/18 2150)   tetanus-diphtheria-acellular pertussis (BOOSTRIX) IM injection 0 5 mL (0 5 mL Intramuscular Given 2/15/18 2150)       Diagnostic Studies  Results Reviewed     None                 No orders to display              Procedures  Procedures       Phone Contacts  ED Phone Contact    ED Course  ED Course                                MDM  CritCare Time    Disposition  Final diagnoses:   Cat bite     Time reflects when diagnosis was documented in both MDM as applicable and the Disposition within this note     Time User Action Codes Description Comment    2/15/2018  9:50 PM Sarah Beth Govea, [address] Cat bite       ED Disposition     ED Disposition Condition Comment    Discharge  Jerson Gates discharge to home/self care      Condition at discharge: Good        Follow-up Information     Follow up With Specialties Details Why Kimberly Conde MD Internal Medicine In 1 day  [address] [place] [address]  [phone]          Discharge Medication List as of 2/15/2018  9:51 PM      START taking these medications    Details   amoxicillin-clavulanate (AUGMENTIN) 875-125 mg per tablet Take 1 tablet by mouth every 12 (twelve) hours for 7 days, Starting Thu 2/15/2018, Until Thu 2/22/2018, Print         CONTINUE these medications which have NOT CHANGED    Details   acetaminophen (TYLENOL) 325 mg tablet Take 2 tablets by mouth every 6 (six) hours as needed for mild pain, Starting Tue 9/5/2017, Print      docusate sodium (COLACE) 100 mg capsule Take 1 capsule by mouth 2 (two) times a day, Starting Tue 9/5/2017, Print      enoxaparin (LOVENOX) 40 mg/0 4 mL Inject 0 4 mL under the skin every 24 hours for 28 days, Starting Tue 9/5/2017, Until Tue 10/3/2017, Print      fluticasone (FLOVENT HFA) 44 mcg/act inhaler Inhale 2 puffs 2 (two) times a day, Historical Med      levothyroxine 75 mcg tablet Take 75 mcg by mouth daily, Historical Med      methocarbamol (ROBAXIN) 500 mg tablet Take 0 5 tablets by mouth every 6 (six) hours, Starting Tue 9/5/2017, Print      modafinil (PROVIGIL) 200 MG tablet Take 200 mg by mouth daily, Starting Tue 12/8/2015, Historical Med      senna (SENOKOT) 8 6 mg Take 1 tablet by mouth daily at bedtime, Starting Tue 9/5/2017, Print           No discharge procedures on file      ED Provider  Electronically Signed by           Dahiana Grover MD  02/18/18 0001

## 2018-02-20 ENCOUNTER — OFFICE VISIT (OUTPATIENT)
Dept: INTERNAL MEDICINE CLINIC | Facility: CLINIC | Age: 63
End: 2018-02-20
Payer: COMMERCIAL

## 2018-02-20 VITALS
HEART RATE: 85 BPM | WEIGHT: 109.4 LBS | SYSTOLIC BLOOD PRESSURE: 114 MMHG | RESPIRATION RATE: 14 BRPM | DIASTOLIC BLOOD PRESSURE: 70 MMHG | BODY MASS INDEX: 19.38 KG/M2 | OXYGEN SATURATION: 96 % | HEIGHT: 63 IN

## 2018-02-20 DIAGNOSIS — I10 BENIGN ESSENTIAL HYPERTENSION: Primary | ICD-10-CM

## 2018-02-20 DIAGNOSIS — Z99.89 OSA ON CPAP: ICD-10-CM

## 2018-02-20 DIAGNOSIS — M85.80 OSTEOPENIA, UNSPECIFIED LOCATION: ICD-10-CM

## 2018-02-20 DIAGNOSIS — R35.1 NOCTURIA: ICD-10-CM

## 2018-02-20 DIAGNOSIS — J45.30 MILD PERSISTENT ASTHMA WITHOUT COMPLICATION: ICD-10-CM

## 2018-02-20 DIAGNOSIS — R73.9 HYPERGLYCEMIA: ICD-10-CM

## 2018-02-20 DIAGNOSIS — E78.2 MIXED HYPERLIPIDEMIA: ICD-10-CM

## 2018-02-20 DIAGNOSIS — G47.33 OSA ON CPAP: ICD-10-CM

## 2018-02-20 DIAGNOSIS — E03.9 ACQUIRED HYPOTHYROIDISM: ICD-10-CM

## 2018-02-20 PROBLEM — I47.1 SUPRAVENTRICULAR TACHYCARDIA (HCC): Status: ACTIVE | Noted: 2017-11-17

## 2018-02-20 PROCEDURE — 99214 OFFICE O/P EST MOD 30 MIN: CPT | Performed by: INTERNAL MEDICINE

## 2018-02-20 RX ORDER — METOPROLOL SUCCINATE 25 MG/1
1 TABLET, EXTENDED RELEASE ORAL DAILY
COMMUNITY
Start: 2017-12-14 | End: 2018-07-10 | Stop reason: SDUPTHER

## 2018-02-22 ENCOUNTER — TELEPHONE (OUTPATIENT)
Dept: PULMONOLOGY | Facility: CLINIC | Age: 63
End: 2018-02-22

## 2018-02-22 DIAGNOSIS — G47.33 OSA ON CPAP: ICD-10-CM

## 2018-02-22 DIAGNOSIS — Z99.89 OSA ON CPAP: ICD-10-CM

## 2018-02-22 DIAGNOSIS — G47.19 EXCESSIVE DAYTIME SLEEPINESS: Primary | ICD-10-CM

## 2018-02-22 RX ORDER — MODAFINIL 200 MG/1
200 TABLET ORAL DAILY
Qty: 90 TABLET | Refills: 3 | Status: SHIPPED | OUTPATIENT
Start: 2018-02-22 | End: 2018-09-11 | Stop reason: SDUPTHER

## 2018-03-01 ENCOUNTER — LAB (OUTPATIENT)
Dept: LAB | Facility: CLINIC | Age: 63
End: 2018-03-01
Payer: COMMERCIAL

## 2018-03-01 ENCOUNTER — TRANSCRIBE ORDERS (OUTPATIENT)
Dept: ADMINISTRATIVE | Facility: HOSPITAL | Age: 63
End: 2018-03-01

## 2018-03-01 DIAGNOSIS — R35.1 NOCTURIA: ICD-10-CM

## 2018-03-01 PROCEDURE — 87086 URINE CULTURE/COLONY COUNT: CPT

## 2018-03-02 LAB — BACTERIA UR CULT: NORMAL

## 2018-03-09 ENCOUNTER — OFFICE VISIT (OUTPATIENT)
Dept: URGENT CARE | Facility: CLINIC | Age: 63
End: 2018-03-09
Payer: COMMERCIAL

## 2018-03-09 ENCOUNTER — APPOINTMENT (OUTPATIENT)
Dept: RADIOLOGY | Facility: CLINIC | Age: 63
End: 2018-03-09
Payer: COMMERCIAL

## 2018-03-09 VITALS
DIASTOLIC BLOOD PRESSURE: 80 MMHG | BODY MASS INDEX: 19.67 KG/M2 | RESPIRATION RATE: 18 BRPM | WEIGHT: 111 LBS | OXYGEN SATURATION: 96 % | HEIGHT: 63 IN | TEMPERATURE: 101.6 F | HEART RATE: 110 BPM | SYSTOLIC BLOOD PRESSURE: 142 MMHG

## 2018-03-09 DIAGNOSIS — R50.9 FEVER, UNSPECIFIED FEVER CAUSE: Primary | ICD-10-CM

## 2018-03-09 PROCEDURE — 71046 X-RAY EXAM CHEST 2 VIEWS: CPT

## 2018-03-09 PROCEDURE — S9088 SERVICES PROVIDED IN URGENT: HCPCS | Performed by: PHYSICIAN ASSISTANT

## 2018-03-09 PROCEDURE — 87798 DETECT AGENT NOS DNA AMP: CPT | Performed by: PHYSICIAN ASSISTANT

## 2018-03-09 PROCEDURE — 99203 OFFICE O/P NEW LOW 30 MIN: CPT | Performed by: PHYSICIAN ASSISTANT

## 2018-03-09 RX ORDER — TRAMADOL HYDROCHLORIDE 50 MG/1
TABLET ORAL
COMMUNITY
End: 2018-06-27 | Stop reason: ALTCHOICE

## 2018-03-09 RX ORDER — OSELTAMIVIR PHOSPHATE 75 MG/1
75 CAPSULE ORAL 2 TIMES DAILY
Qty: 10 CAPSULE | Refills: 0 | Status: SHIPPED | OUTPATIENT
Start: 2018-03-09 | End: 2018-03-14

## 2018-03-09 RX ORDER — ALBUTEROL SULFATE 90 UG/1
2 AEROSOL, METERED RESPIRATORY (INHALATION) EVERY 6 HOURS PRN
COMMUNITY
Start: 2015-06-09

## 2018-03-09 RX ORDER — ACETAMINOPHEN 325 MG/1
975 TABLET ORAL ONCE
Status: COMPLETED | OUTPATIENT
Start: 2018-03-09 | End: 2018-03-09

## 2018-03-09 RX ADMIN — ACETAMINOPHEN 975 MG: 325 TABLET ORAL at 18:43

## 2018-03-09 NOTE — PROGRESS NOTES
[address] [facility] [facility]        NAME: Bull Briseno is a 70 y o  female  : 1955    MRN: [id number]  DATE: 2018  TIME: 6:52 PM    Assessment and Plan   Fever, unspecified fever cause [R50 9]  1  Fever, unspecified fever cause  XR chest pa & lateral    Influenza A/B and RSV by PCR    acetaminophen (TYLENOL) tablet 975 mg    oseltamivir (TAMIFLU) 75 mg capsule         Patient Instructions   Patient would like to proceed with doing flu swab- I explained to her that she may get charged    1  Fever- likely influenza given recent exposure by roommate  -Chest x-ray as reviewed by myself is negative for pneumonia  -Flu-swab is pending- please call tomorrow for results  -Take Tamiflu as directed  -Tylenol/motrin  -Increase fluids  -Follow-up with PCP within 2-3 days for re-evaluation    Go immediately to the ER with worsening symptoms, high fever, chest pain, difficulty breathing, signs of distress or any new concerns          Chief Complaint     Chief Complaint   Patient presents with    Cough     x last night    Fatigue     x last night    Fever    Generalized Body Aches     x last night         History of Present Illness       The patient presents today for an evaluation of a flu-like symptoms that started last night  The patient admits to fever, chills, cough, fatigue and chills  The patient had a temperature today of 100 degrees  The patient took tylenol around 9:30 am  The patient states that her roommate was diagnosed with the flu on Wednesday  The patient received a flu shot in October  The patient had to use her proair inhaler earlier today  Patient states that she has a history of left sided rib fracture from a couple months ago and states that she still uses her incentive spirometer without difficulty  Review of Systems   Review of Systems   Constitutional: Positive for chills, fatigue and fever  HENT: Positive for rhinorrhea  Negative for sore throat      Respiratory: Positive for cough  Negative for shortness of breath  Cardiovascular: Negative for chest pain  Gastrointestinal: Negative for abdominal pain, diarrhea and vomiting  Musculoskeletal: Positive for arthralgias  Neurological: Negative for headaches           Current Medications       Current Outpatient Prescriptions:     acetaminophen (TYLENOL) 325 mg tablet, Take 2 tablets by mouth every 6 (six) hours as needed for mild pain, Disp: 30 tablet, Rfl: 0    albuterol (PROVENTIL HFA) 90 mcg/act inhaler, Inhale 2 puffs every 6 (six) hours as needed, Disp: , Rfl:     docusate sodium (COLACE) 100 mg capsule, Take 1 capsule by mouth 2 (two) times a day, Disp: 10 capsule, Rfl: 0    levothyroxine 75 mcg tablet, Take 75 mcg by mouth daily, Disp: , Rfl:     metoprolol succinate (TOPROL-XL) 25 mg 24 hr tablet, Take 1 tablet by mouth daily, Disp: , Rfl:     modafinil (PROVIGIL) 200 MG tablet, Take 200 mg by mouth daily, Disp: , Rfl:     senna (SENOKOT) 8 6 mg, Take 1 tablet by mouth daily at bedtime, Disp: 30 each, Rfl: 0    enoxaparin (LOVENOX) 40 mg/0 4 mL, Inject 0 4 mL under the skin every 24 hours for 28 days, Disp: 11 2 mL, Rfl: 0    fluticasone (FLOVENT HFA) 44 mcg/act inhaler, Inhale 2 puffs 2 (two) times a day, Disp: , Rfl:     modafinil (PROVIGIL) 200 MG tablet, Take 1 tablet (200 mg total) by mouth daily, Disp: 90 tablet, Rfl: 3    oseltamivir (TAMIFLU) 75 mg capsule, Take 1 capsule (75 mg total) by mouth 2 (two) times a day for 5 days, Disp: 10 capsule, Rfl: 0    OxyCODONE HCl 5 MG TABA, Take 1 tablet by mouth every 4 (four) hours as needed, Disp: , Rfl:     traMADol (ULTRAM) 50 mg tablet, Take by mouth, Disp: , Rfl:     Current Facility-Administered Medications:     acetaminophen (TYLENOL) tablet 975 mg, 975 mg, Oral, Once, Beena Aguero PA-C    Current Allergies     Allergies as of 03/09/2018    (No Known Allergies)            The following portions of the patient's history were reviewed and updated as appropriate: allergies, current medications, past family history, past medical history, past social history, past surgical history and problem list      Past Medical History:   Diagnosis Date    Allergic     Asthma     Cat bite     Disease of thyroid gland     Hypertension     Hypothyroidism     Sleep apnea        Past Surgical History:   Procedure Laterality Date    ORIF FACIAL FRACTURE         Family History   Problem Relation Age of Onset    Arthritis Mother     Dementia Mother     Rheum arthritis Mother     Heart failure Father          Medications have been verified  Objective   /80 (BP Location: Left arm, Patient Position: Sitting, Cuff Size: Standard)   Pulse (!) 110   Temp (!) 101 6 °F (38 7 °C) (Tympanic)   Resp 18   Ht 5' 3" (1 6 m)   Wt 50 3 kg (111 lb)   SpO2 96%   BMI 19 66 kg/m²        Physical Exam     Physical Exam   Constitutional: She is oriented to person, place, and time  She appears well-developed and well-nourished  No distress  HENT:   Right Ear: Tympanic membrane and external ear normal    Left Ear: Tympanic membrane and external ear normal    Nose: Rhinorrhea present  Mouth/Throat: Uvula is midline, oropharynx is clear and moist and mucous membranes are normal  No oropharyngeal exudate  Eyes: Conjunctivae and EOM are normal  Pupils are equal, round, and reactive to light  Neck: Normal range of motion  Neck supple  Cardiovascular: Regular rhythm, S1 normal, S2 normal and normal heart sounds  Tachycardia present  Pulmonary/Chest: Effort normal and breath sounds normal  No respiratory distress  She has no wheezes  She has no rales  Lymphadenopathy:     She has no cervical adenopathy  Neurological: She is alert and oriented to person, place, and time  Skin: Skin is warm and dry  Psychiatric: She has a normal mood and affect  Nursing note and vitals reviewed  Patient Instructions     1   Fever- likely influenza given recent exposure by roommate  -Chest x-ray as reviewed by myself is negative for pneumonia  -Flu-swab is pending- please call tomorrow for results  -Take Tamiflu as directed  -Tylenol/motrin  -Increase fluids  -Follow-up with PCP within 2-3 days for re-evaluation    Go immediately to the ER with worsening symptoms, high fever, chest pain, difficulty breathing, signs of distress or any new concerns      Influenza   AMBULATORY CARE:   Influenza  (the flu) is an infection caused by the influenza virus  The flu is easily spread when an infected person coughs, sneezes, or has close contact with others  You may be able to spread the flu to others for 1 week or longer after signs or symptoms appear  Common signs and symptoms include the following:   · Fever and chills    · Headaches, body aches, and muscle or joint pain    · Cough, runny nose, and sore throat    · Loss of appetite, nausea, vomiting, or diarrhea    · Tiredness    · Trouble breathing  Call 911 for any of the following:   · You have trouble breathing, and your lips look purple or blue  · You have a seizure  Seek care immediately if:   · You are dizzy, or you are urinating less or not at all  · You have a headache with a stiff neck, and you feel tired or confused  · You have new pain or pressure in your chest     · Your symptoms, such as shortness of breath, vomiting, or diarrhea, get worse  · Your symptoms, such as fever and coughing, seem to get better, but then get worse  Contact your healthcare provider if:   · You have new muscle pain or weakness  · You have questions or concerns about your condition or care  Treatment for influenza  may include any of the following:  · Acetaminophen  decreases pain and fever  It is available without a doctor's order  Ask how much to take and how often to take it  Follow directions  Acetaminophen can cause liver damage if not taken correctly  · NSAIDs , such as ibuprofen, help decrease swelling, pain, and fever   This medicine is available with or without a doctor's order  NSAIDs can cause stomach bleeding or kidney problems in certain people  If you take blood thinner medicine, always ask your healthcare provider if NSAIDs are safe for you  Always read the medicine label and follow directions  · Antivirals  help fight a viral infection  Manage your symptoms:   · Rest  as much as you can to help you recover  · Drink liquids as directed  to help prevent dehydration  Ask how much liquid to drink each day and which liquids are best for you  Prevent the spread of the flu:   · Wash your hands often  Use soap and water  Wash your hands after you use the bathroom, change a child's diapers, or sneeze  Wash your hands before you prepare or eat food  Use gel hand cleanser when soap and water are not available  Do not touch your eyes, nose, or mouth unless you have washed your hands first            · Cover your mouth when you sneeze or cough  Cough into a tissue or the bend of your arm  · Clean shared items with a germ-killing   Clean table surfaces, doorknobs, and light switches  Do not share towels, silverware, and dishes with people who are sick  Wash bed sheets, towels, silverware, and dishes with soap and water  · Wear a mask  over your mouth and nose if you are sick or are near anyone who is sick  · Stay away from others  if you are sick  · Influenza vaccine  helps prevent influenza (flu)  Everyone older than 6 months should get a yearly influenza vaccine  Get the vaccine as soon as it is available, usually in September or October each year  Follow up with your healthcare provider as directed:  Write down your questions so you remember to ask them during your visits  © 2017 [address] [employer] Information is for End User's use only and may not be sold, redistributed or otherwise used for commercial purposes   All illustrations and images included in CareNotes® are the copyrighted property of [employer]  or [employer]  The above information is an  only  It is not intended as medical advice for individual conditions or treatments  Talk to your doctor, nurse or pharmacist before following any medical regimen to see if it is safe and effective for you

## 2018-03-09 NOTE — PATIENT INSTRUCTIONS
1  Fever- likely influenza given recent exposure by roommate  -Chest x-ray as reviewed by myself is negative for pneumonia  -Flu-swab is pending- please call tomorrow for results  -Take Tamiflu as directed  -Tylenol/motrin  -Increase fluids  -Follow-up with PCP within 2-3 days for re-evaluation    Go immediately to the ER with worsening symptoms, high fever, chest pain, difficulty breathing, signs of distress or any new concerns      Influenza   AMBULATORY CARE:   Influenza  (the flu) is an infection caused by the influenza virus  The flu is easily spread when an infected person coughs, sneezes, or has close contact with others  You may be able to spread the flu to others for 1 week or longer after signs or symptoms appear  Common signs and symptoms include the following:   · Fever and chills    · Headaches, body aches, and muscle or joint pain    · Cough, runny nose, and sore throat    · Loss of appetite, nausea, vomiting, or diarrhea    · Tiredness    · Trouble breathing  Call 911 for any of the following:   · You have trouble breathing, and your lips look purple or blue  · You have a seizure  Seek care immediately if:   · You are dizzy, or you are urinating less or not at all  · You have a headache with a stiff neck, and you feel tired or confused  · You have new pain or pressure in your chest     · Your symptoms, such as shortness of breath, vomiting, or diarrhea, get worse  · Your symptoms, such as fever and coughing, seem to get better, but then get worse  Contact your healthcare provider if:   · You have new muscle pain or weakness  · You have questions or concerns about your condition or care  Treatment for influenza  may include any of the following:  · Acetaminophen  decreases pain and fever  It is available without a doctor's order  Ask how much to take and how often to take it  Follow directions  Acetaminophen can cause liver damage if not taken correctly      · NSAIDs , such as ibuprofen, help decrease swelling, pain, and fever  This medicine is available with or without a doctor's order  NSAIDs can cause stomach bleeding or kidney problems in certain people  If you take blood thinner medicine, always ask your healthcare provider if NSAIDs are safe for you  Always read the medicine label and follow directions  · Antivirals  help fight a viral infection  Manage your symptoms:   · Rest  as much as you can to help you recover  · Drink liquids as directed  to help prevent dehydration  Ask how much liquid to drink each day and which liquids are best for you  Prevent the spread of the flu:   · Wash your hands often  Use soap and water  Wash your hands after you use the bathroom, change a child's diapers, or sneeze  Wash your hands before you prepare or eat food  Use gel hand cleanser when soap and water are not available  Do not touch your eyes, nose, or mouth unless you have washed your hands first            · Cover your mouth when you sneeze or cough  Cough into a tissue or the bend of your arm  · Clean shared items with a germ-killing   Clean table surfaces, doorknobs, and light switches  Do not share towels, silverware, and dishes with people who are sick  Wash bed sheets, towels, silverware, and dishes with soap and water  · Wear a mask  over your mouth and nose if you are sick or are near anyone who is sick  · Stay away from others  if you are sick  · Influenza vaccine  helps prevent influenza (flu)  Everyone older than 6 months should get a yearly influenza vaccine  Get the vaccine as soon as it is available, usually in September or October each year  Follow up with your healthcare provider as directed:  Write down your questions so you remember to ask them during your visits  © 2017 [employer] Information is for End User's use only and may not be sold, redistributed or otherwise used for commercial purposes   All illustrations and images included in CareNotes® are the copyrighted property of [employer]  or [address]  The above information is an  only  It is not intended as medical advice for individual conditions or treatments  Talk to your doctor, nurse or pharmacist before following any medical regimen to see if it is safe and effective for you

## 2018-03-10 ENCOUNTER — TELEPHONE (OUTPATIENT)
Dept: URGENT CARE | Age: 63
End: 2018-03-10

## 2018-03-10 LAB
FLUAV AG SPEC QL: DETECTED
FLUBV AG SPEC QL: ABNORMAL
RSV B RNA SPEC QL NAA+PROBE: ABNORMAL

## 2018-03-16 ENCOUNTER — OFFICE VISIT (OUTPATIENT)
Dept: INTERNAL MEDICINE CLINIC | Facility: CLINIC | Age: 63
End: 2018-03-16
Payer: COMMERCIAL

## 2018-03-16 VITALS
OXYGEN SATURATION: 98 % | WEIGHT: 109.2 LBS | DIASTOLIC BLOOD PRESSURE: 92 MMHG | HEART RATE: 76 BPM | TEMPERATURE: 98 F | HEIGHT: 63 IN | SYSTOLIC BLOOD PRESSURE: 168 MMHG | BODY MASS INDEX: 19.35 KG/M2

## 2018-03-16 DIAGNOSIS — J06.9 VIRAL UPPER RESPIRATORY TRACT INFECTION: Primary | ICD-10-CM

## 2018-03-16 PROCEDURE — 99213 OFFICE O/P EST LOW 20 MIN: CPT | Performed by: PHYSICIAN ASSISTANT

## 2018-03-16 NOTE — PROGRESS NOTES
Assessment/Plan:  She is feeling fine aching feverishness cold symptoms of all resolved continue her regular follow-up    No problem-specific Assessment & Plan notes found for this encounter  Diagnoses and all orders for this visit:    Viral upper respiratory tract infection          Subjective:      Patient ID: Octavia Chua is a 70 y o  female  Seen in urgent care 6 days ago because of fever aching and fatigue also some coughing and scratchy throat  She was found to have influenza a and treated with Tamiflu  She is here for follow-up her symptoms have resolved  She is ambulatory she is back to her baseline  She denies shortness of breath chest pain palpitations dizziness nausea vomiting or appetite has been good        The following portions of the patient's history were reviewed and updated as appropriate: allergies, current medications, past family history, past medical history, past social history, past surgical history and problem list     Review of Systems   Constitutional: Negative for activity change, appetite change, chills, diaphoresis, fatigue, fever and unexpected weight change  HENT: Negative for congestion, dental problem, drooling, ear discharge, ear pain, facial swelling, hearing loss, nosebleeds, postnasal drip, rhinorrhea, sinus pain, sinus pressure, sneezing, sore throat, tinnitus, trouble swallowing and voice change  Eyes: Negative for photophobia, pain, discharge, redness, itching and visual disturbance  Respiratory: Negative for apnea, cough, choking, chest tightness, shortness of breath, wheezing and stridor  Cardiovascular: Negative for chest pain, palpitations and leg swelling  Gastrointestinal: Negative for abdominal distention, abdominal pain, anal bleeding, blood in stool, constipation, diarrhea, nausea, rectal pain and vomiting  Endocrine: Negative for cold intolerance, heat intolerance, polydipsia, polyphagia and polyuria     Genitourinary: Negative for decreased urine volume, difficulty urinating, dysuria, enuresis, flank pain, frequency, genital sores, hematuria and urgency  Musculoskeletal: Negative for arthralgias, back pain, gait problem, joint swelling, myalgias, neck pain and neck stiffness  Skin: Negative for color change, pallor, rash and wound  Neurological: Negative for dizziness, tremors, seizures, syncope, facial asymmetry, speech difficulty, weakness, light-headedness, numbness and headaches  Hematological: Negative for adenopathy  Does not bruise/bleed easily  Psychiatric/Behavioral: Negative for agitation, behavioral problems, confusion, decreased concentration, dysphoric mood, hallucinations, self-injury, sleep disturbance and suicidal ideas  The patient is not nervous/anxious and is not hyperactive  Objective:    /92 (BP Location: Left arm, Patient Position: Sitting)   Pulse 76   Temp 98 °F (36 7 °C)   Ht 5' 3" (1 6 m)   Wt 49 5 kg (109 lb 3 2 oz)   SpO2 98%   BMI 19 34 kg/m²      Physical Exam   Constitutional: She is oriented to person, place, and time  She appears well-developed  HENT:   Head: Normocephalic  Right Ear: External ear normal    Left Ear: External ear normal    Mouth/Throat: Oropharynx is clear and moist    Eyes: Conjunctivae are normal  Pupils are equal, round, and reactive to light  Neck: Neck supple  No thyromegaly present  Cardiovascular: Normal rate, regular rhythm, normal heart sounds and intact distal pulses  Pulmonary/Chest: Effort normal and breath sounds normal  No respiratory distress  She has no wheezes  Abdominal: Soft  Bowel sounds are normal    Musculoskeletal: Normal range of motion  Neurological: She is alert and oriented to person, place, and time  She has normal reflexes  No cranial nerve deficit  Coordination normal    Skin: Skin is warm and dry  No rash noted  No erythema  No pallor

## 2018-05-18 DIAGNOSIS — E03.9 ACQUIRED HYPOTHYROIDISM: Primary | ICD-10-CM

## 2018-05-18 RX ORDER — LEVOTHYROXINE SODIUM 0.07 MG/1
TABLET ORAL
Qty: 90 TABLET | Refills: 3 | Status: SHIPPED | OUTPATIENT
Start: 2018-05-18 | End: 2019-05-05 | Stop reason: SDUPTHER

## 2018-05-22 ENCOUNTER — OFFICE VISIT (OUTPATIENT)
Dept: FAMILY MEDICINE CLINIC | Facility: CLINIC | Age: 63
End: 2018-05-22
Payer: COMMERCIAL

## 2018-05-22 ENCOUNTER — APPOINTMENT (OUTPATIENT)
Dept: RADIOLOGY | Facility: CLINIC | Age: 63
End: 2018-05-22
Payer: COMMERCIAL

## 2018-05-22 VITALS
DIASTOLIC BLOOD PRESSURE: 76 MMHG | BODY MASS INDEX: 18.43 KG/M2 | WEIGHT: 104 LBS | TEMPERATURE: 98.3 F | SYSTOLIC BLOOD PRESSURE: 118 MMHG | HEIGHT: 63 IN | OXYGEN SATURATION: 96 % | HEART RATE: 90 BPM

## 2018-05-22 DIAGNOSIS — E03.9 ACQUIRED HYPOTHYROIDISM: ICD-10-CM

## 2018-05-22 DIAGNOSIS — R41.3 MEMORY CHANGES: ICD-10-CM

## 2018-05-22 DIAGNOSIS — Z23 NEED FOR VACCINATION FOR PNEUMOCOCCUS: ICD-10-CM

## 2018-05-22 DIAGNOSIS — Z86.79 HISTORY OF SUBDURAL HEMATOMA: ICD-10-CM

## 2018-05-22 DIAGNOSIS — Z76.89 ENCOUNTER TO ESTABLISH CARE: Primary | ICD-10-CM

## 2018-05-22 DIAGNOSIS — M54.50 CHRONIC MIDLINE LOW BACK PAIN WITHOUT SCIATICA: ICD-10-CM

## 2018-05-22 DIAGNOSIS — J45.30 MILD PERSISTENT ASTHMA WITHOUT COMPLICATION: ICD-10-CM

## 2018-05-22 DIAGNOSIS — R73.9 HYPERGLYCEMIA: ICD-10-CM

## 2018-05-22 DIAGNOSIS — G89.29 CHRONIC MIDLINE LOW BACK PAIN WITHOUT SCIATICA: ICD-10-CM

## 2018-05-22 DIAGNOSIS — N95.0 POSTMENOPAUSAL VAGINAL BLEEDING: ICD-10-CM

## 2018-05-22 PROBLEM — R53.81 PHYSICAL DECONDITIONING: Status: RESOLVED | Noted: 2017-08-29 | Resolved: 2018-05-22

## 2018-05-22 PROBLEM — S06.5X9A SUBDURAL HEMATOMA (HCC): Status: RESOLVED | Noted: 2017-08-27 | Resolved: 2018-05-22

## 2018-05-22 PROBLEM — R26.2 AMBULATORY DYSFUNCTION: Status: RESOLVED | Noted: 2017-08-29 | Resolved: 2018-05-22

## 2018-05-22 PROBLEM — S02.40FA CLOSED FRACTURE OF LEFT ZYGOMATIC ARCH (HCC): Status: RESOLVED | Noted: 2017-08-28 | Resolved: 2018-05-22
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
## 2018-05-22 PROBLEM — I60.9 SAH (SUBARACHNOID HEMORRHAGE) (HCC): Status: RESOLVED | Noted: 2017-08-27 | Resolved: 2018-05-22

## 2018-05-22 PROBLEM — S02.19XA CLOSED FRACTURE OF TEMPORAL BONE (HCC): Status: RESOLVED | Noted: 2017-08-27 | Resolved: 2018-05-22

## 2018-05-22 PROBLEM — S42.125A CLOSED NONDISPLACED FRACTURE OF LEFT ACROMIAL PROCESS: Status: RESOLVED | Noted: 2017-08-28 | Resolved: 2018-05-22

## 2018-05-22 PROBLEM — S22.42XA CLOSED FRACTURE OF MULTIPLE RIBS OF LEFT SIDE: Status: RESOLVED | Noted: 2017-08-27 | Resolved: 2018-05-22

## 2018-05-22 PROBLEM — S27.0XXA TRAUMATIC PNEUMOTHORAX: Status: RESOLVED | Noted: 2017-08-27 | Resolved: 2018-05-22

## 2018-05-22 PROCEDURE — 99204 OFFICE O/P NEW MOD 45 MIN: CPT | Performed by: FAMILY MEDICINE

## 2018-05-22 PROCEDURE — 72110 X-RAY EXAM L-2 SPINE 4/>VWS: CPT

## 2018-05-22 PROCEDURE — 90670 PCV13 VACCINE IM: CPT | Performed by: FAMILY MEDICINE

## 2018-05-22 PROCEDURE — G0009 ADMIN PNEUMOCOCCAL VACCINE: HCPCS | Performed by: FAMILY MEDICINE

## 2018-05-22 RX ORDER — FLUTICASONE PROPIONATE 44 UG/1
2 AEROSOL, METERED RESPIRATORY (INHALATION) 2 TIMES DAILY
Qty: 1 INHALER | Refills: 4 | Status: SHIPPED | OUTPATIENT
Start: 2018-05-22 | End: 2018-10-15 | Stop reason: SDUPTHER

## 2018-05-22 NOTE — PROGRESS NOTES
Assessment/Plan:       Diagnoses and all orders for this visit:    Encounter to establish care    Memory changes  -     Ambulatory referral to Neurology; Future  -     MRI brain wo contrast; Future  -     Vitamin B12; Future  -     Folate; Future  -     RPR; Future  -     Lyme Antibody Profile with reflex to WB; Future    Need for vaccination for pneumococcus  -     PNEUMOCOCCAL CONJUGATE VACCINE 13-VALENT GREATER THAN 6 MONTHS    Mild persistent asthma without complication  -     fluticasone (FLOVENT HFA) 44 mcg/act inhaler; Inhale 2 puffs 2 (two) times a day    Acquired hypothyroidism  -     TSH, 3rd generation; Future  -     Lipid Panel with Direct LDL reflex; Future    Hyperglycemia  -     HEMOGLOBIN A1C W/ EAG ESTIMATION; Future    Chronic midline low back pain without sciatica  -     XR spine lumbar minimum 4 views non injury; Future    History of subdural hematoma    Postmenopausal vaginal bleeding        1  Memory issues - I am ordering an MRI and labs  She plans to see Dr Morocho [facility] in [place]  I gave her the # so she can call  2  Asthma - renewed Flovent  Well controlled  3  Post menopausal Vaginal bleeding - has D&C scheduled  She is medically stable for this procedure  4  Thyroid & hyperglycemia- check labs  5  Back pain - check Xrays  F/U 2 mo    Subjective:      Patient ID: Hayley Rockwell is a 70 y o  female  New patient here to establish care  Here with her roommate with whom she has lived for over 30 years  Last year in August 2017 she sustained a traumatic fall with multiple injuries including a subdural hematoma, and pneumothorax  She had been in rehab for a month at that time  She has had issues with her memory since that time  Her roommate reports she is doing "abnormal things mentally"  She has not been paying the bills, cannot make proper decision- per roommate this is completely abnormal behavior for Cristobal    Her roommate reports that she thinks she may have Asperger's - she apparently took a test and had several points on this test  She has a referral for a neurologist Dr Mindy Cadet  Recently has been having vaginal bleeding and states she saw Gynecology and states she has a "cyst" - has D&C scheduled for June 8th  She states she needs pre operative clearance  She had labs done CBC, BMP and these were normal      She c/o lower back pain for months  Started after she fell in August   Pain does not radiate  No current treatment  Back Pain   Pertinent negatives include no abdominal pain, chest pain, fever, headaches, numbness or weakness  Fatigue   Associated symptoms include fatigue  Pertinent negatives include no abdominal pain, arthralgias, chest pain, chills, congestion, coughing, fever, headaches, joint swelling, myalgias, nausea, numbness, rash, sore throat, vomiting or weakness  The following portions of the patient's history were reviewed and updated as appropriate:   She  has a past medical history of Allergic; Asthma; Cat bite; Disease of thyroid gland; Hypertension; Hypothyroidism; and Sleep apnea  She   Patient Active Problem List    Diagnosis Date Noted    Memory changes 05/22/2018    Hyperglycemia 02/20/2018    Benign essential hypertension 12/14/2017    Supraventricular tachycardia ([facility] [address] ) 11/17/2017    Hypothyroidism 08/29/2017    SEBASTIÁN on CPAP 08/28/2017    Mild persistent asthma 10/11/2016    Hyperlipidemia 10/11/2016    Hypersomnia 07/07/2015    Osteoarthrosis 06/09/2014    Osteopenia 06/09/2014     She  has a past surgical history that includes ORIF facial fracture  Her family history includes Arthritis in her mother; Dementia in her mother; Heart failure in her father; Rheum arthritis in her mother  She  reports that she has never smoked  She has never used smokeless tobacco  She reports that she drinks alcohol  She reports that she does not use drugs    Current Outpatient Prescriptions   Medication Sig Dispense Refill    acetaminophen (TYLENOL) 325 mg tablet Take 2 tablets by mouth every 6 (six) hours as needed for mild pain 30 tablet 0    albuterol (PROVENTIL HFA) 90 mcg/act inhaler Inhale 2 puffs every 6 (six) hours as needed      docusate sodium (COLACE) 100 mg capsule Take 1 capsule by mouth 2 (two) times a day 10 capsule 0    fluticasone (FLOVENT HFA) 44 mcg/act inhaler Inhale 2 puffs 2 (two) times a day 1 Inhaler 4    levothyroxine 75 mcg tablet TAKE 1 TABLET DAILY 90 tablet 3    metoprolol succinate (TOPROL-XL) 25 mg 24 hr tablet Take 1 tablet by mouth daily      modafinil (PROVIGIL) 200 MG tablet Take 1 tablet (200 mg total) by mouth daily 90 tablet 3    OxyCODONE HCl 5 MG TABA Take 1 tablet by mouth every 4 (four) hours as needed      senna (SENOKOT) 8 6 mg Take 1 tablet by mouth daily at bedtime 30 each 0    traMADol (ULTRAM) 50 mg tablet Take by mouth       No current facility-administered medications for this visit  Current Outpatient Prescriptions on File Prior to Visit   Medication Sig    acetaminophen (TYLENOL) 325 mg tablet Take 2 tablets by mouth every 6 (six) hours as needed for mild pain    albuterol (PROVENTIL HFA) 90 mcg/act inhaler Inhale 2 puffs every 6 (six) hours as needed    docusate sodium (COLACE) 100 mg capsule Take 1 capsule by mouth 2 (two) times a day    levothyroxine 75 mcg tablet TAKE 1 TABLET DAILY    metoprolol succinate (TOPROL-XL) 25 mg 24 hr tablet Take 1 tablet by mouth daily    modafinil (PROVIGIL) 200 MG tablet Take 1 tablet (200 mg total) by mouth daily    OxyCODONE HCl 5 MG TABA Take 1 tablet by mouth every 4 (four) hours as needed    senna (SENOKOT) 8 6 mg Take 1 tablet by mouth daily at bedtime    traMADol (ULTRAM) 50 mg tablet Take by mouth    [DISCONTINUED] fluticasone (FLOVENT HFA) 44 mcg/act inhaler Inhale 2 puffs 2 (two) times a day     No current facility-administered medications on file prior to visit  She has No Known Allergies       Review of Systems Constitutional: Positive for fatigue  Negative for activity change, appetite change, chills, fever and unexpected weight change  HENT: Negative for congestion, ear discharge, ear pain, postnasal drip, sinus pressure and sore throat  Eyes: Negative for discharge and visual disturbance  Respiratory: Negative for cough, shortness of breath and wheezing  Cardiovascular: Negative for chest pain, palpitations and leg swelling  Gastrointestinal: Negative for abdominal pain, constipation, diarrhea, nausea and vomiting  Endocrine: Negative for cold intolerance, heat intolerance, polydipsia and polyuria  Genitourinary: Positive for menstrual problem  Negative for difficulty urinating and frequency  Vaginal bleeding   Musculoskeletal: Positive for back pain  Negative for arthralgias, joint swelling and myalgias  Skin: Negative for rash  Neurological: Negative for dizziness, weakness, light-headedness, numbness and headaches  Hematological: Negative for adenopathy  Psychiatric/Behavioral: Positive for behavioral problems and confusion  Negative for dysphoric mood, sleep disturbance and suicidal ideas  The patient is not nervous/anxious  Objective:      /76   Pulse 90   Temp 98 3 °F (36 8 °C)   Ht 5' 3" (1 6 m)   Wt 47 2 kg (104 lb)   SpO2 96%   BMI 18 42 kg/m²          Physical Exam   Constitutional: She is oriented to person, place, and time  She appears well-developed and well-nourished  No distress  Thin woman, no acute distress   HENT:   Head: Normocephalic and atraumatic  Right Ear: Hearing, tympanic membrane, external ear and ear canal normal    Left Ear: Hearing, tympanic membrane, external ear and ear canal normal    Nose: Nose normal    Mouth/Throat: Oropharynx is clear and moist and mucous membranes are normal  No oropharyngeal exudate  Eyes: Conjunctivae and EOM are normal  Pupils are equal, round, and reactive to light  Neck: Neck supple   No thyromegaly present  Cardiovascular: Normal rate, regular rhythm and normal heart sounds  Exam reveals no gallop and no friction rub  No murmur heard  Pulmonary/Chest: Effort normal and breath sounds normal  No respiratory distress  She has no wheezes  She has no rales  She exhibits no tenderness  Abdominal: Soft  Bowel sounds are normal  She exhibits no distension and no mass  There is no tenderness  There is no rebound and no guarding  Musculoskeletal: Normal range of motion  She exhibits no edema  Lumbar back: She exhibits bony tenderness and pain  She exhibits no swelling  Lymphadenopathy:     She has no cervical adenopathy  Neurological: She is alert and oriented to person, place, and time  No cranial nerve deficit  Skin: Skin is warm and dry  No rash noted  She is not diaphoretic  Psychiatric: She has a normal mood and affect  Her behavior is normal  Judgment and thought content normal    Nursing note and vitals reviewed

## 2018-06-02 ENCOUNTER — HOSPITAL ENCOUNTER (OUTPATIENT)
Dept: MRI IMAGING | Facility: HOSPITAL | Age: 63
Discharge: HOME | End: 2018-06-02
Payer: COMMERCIAL

## 2018-06-02 DIAGNOSIS — R41.3 MEMORY CHANGES: ICD-10-CM

## 2018-06-02 PROCEDURE — 70551 MRI BRAIN STEM W/O DYE: CPT

## 2018-06-04 ENCOUNTER — APPOINTMENT (OUTPATIENT)
Dept: LAB | Facility: CLINIC | Age: 63
End: 2018-06-04
Payer: COMMERCIAL

## 2018-06-04 DIAGNOSIS — R73.9 HYPERGLYCEMIA: ICD-10-CM

## 2018-06-04 DIAGNOSIS — R41.3 MEMORY CHANGES: ICD-10-CM

## 2018-06-04 DIAGNOSIS — E03.9 ACQUIRED HYPOTHYROIDISM: ICD-10-CM

## 2018-06-04 LAB
CHOLEST SERPL-MCNC: 223 MG/DL (ref 50–200)
EST. AVERAGE GLUCOSE BLD GHB EST-MCNC: 117 MG/DL
FOLATE SERPL-MCNC: >20 NG/ML (ref 3.1–17.5)
HBA1C MFR BLD: 5.7 % (ref 4.2–6.3)
HDLC SERPL-MCNC: 82 MG/DL (ref 40–60)
LDLC SERPL CALC-MCNC: 126 MG/DL (ref 0–100)
TRIGL SERPL-MCNC: 74 MG/DL
TSH SERPL DL<=0.05 MIU/L-ACNC: 1.02 UIU/ML (ref 0.36–3.74)
VIT B12 SERPL-MCNC: 459 PG/ML (ref 100–900)

## 2018-06-04 PROCEDURE — 36415 COLL VENOUS BLD VENIPUNCTURE: CPT

## 2018-06-04 PROCEDURE — 86618 LYME DISEASE ANTIBODY: CPT

## 2018-06-04 PROCEDURE — 80061 LIPID PANEL: CPT

## 2018-06-04 PROCEDURE — 82746 ASSAY OF FOLIC ACID SERUM: CPT

## 2018-06-04 PROCEDURE — 83036 HEMOGLOBIN GLYCOSYLATED A1C: CPT

## 2018-06-04 PROCEDURE — 86592 SYPHILIS TEST NON-TREP QUAL: CPT

## 2018-06-04 PROCEDURE — 84443 ASSAY THYROID STIM HORMONE: CPT

## 2018-06-04 PROCEDURE — 82607 VITAMIN B-12: CPT

## 2018-06-05 LAB
B BURGDOR IGG SER IA-ACNC: 0.07
B BURGDOR IGM SER IA-ACNC: 0.09
RPR SER QL: NORMAL

## 2018-06-21 ENCOUNTER — TRANSCRIBE ORDERS (OUTPATIENT)
Dept: ADMINISTRATIVE | Facility: HOSPITAL | Age: 63
End: 2018-06-21

## 2018-06-21 DIAGNOSIS — C79.31 SECONDARY MALIGNANT NEOPLASM OF BRAIN AND SPINAL CORD (HCC): ICD-10-CM

## 2018-06-21 DIAGNOSIS — E55.9 AVITAMINOSIS D: ICD-10-CM

## 2018-06-21 DIAGNOSIS — E85.4 AMYLOID NEPHROPATHY (HCC): Primary | ICD-10-CM

## 2018-06-21 DIAGNOSIS — G57.22 LESION OF LEFT FEMORAL NERVE: ICD-10-CM

## 2018-06-21 DIAGNOSIS — C79.49 SECONDARY MALIGNANT NEOPLASM OF BRAIN AND SPINAL CORD (HCC): ICD-10-CM

## 2018-06-21 DIAGNOSIS — N08 AMYLOID NEPHROPATHY (HCC): Primary | ICD-10-CM

## 2018-06-27 ENCOUNTER — OFFICE VISIT (OUTPATIENT)
Dept: PULMONOLOGY | Facility: CLINIC | Age: 63
End: 2018-06-27
Payer: COMMERCIAL

## 2018-06-27 ENCOUNTER — TELEPHONE (OUTPATIENT)
Dept: PULMONOLOGY | Facility: CLINIC | Age: 63
End: 2018-06-27

## 2018-06-27 ENCOUNTER — APPOINTMENT (OUTPATIENT)
Dept: LAB | Facility: CLINIC | Age: 63
End: 2018-06-27
Payer: COMMERCIAL

## 2018-06-27 VITALS
HEIGHT: 63 IN | DIASTOLIC BLOOD PRESSURE: 78 MMHG | SYSTOLIC BLOOD PRESSURE: 136 MMHG | HEART RATE: 100 BPM | WEIGHT: 98 LBS | BODY MASS INDEX: 17.36 KG/M2 | OXYGEN SATURATION: 97 %

## 2018-06-27 DIAGNOSIS — E55.9 AVITAMINOSIS D: ICD-10-CM

## 2018-06-27 DIAGNOSIS — G47.33 OSA ON CPAP: ICD-10-CM

## 2018-06-27 DIAGNOSIS — N08 AMYLOID NEPHROPATHY (HCC): ICD-10-CM

## 2018-06-27 DIAGNOSIS — E85.4 AMYLOID NEPHROPATHY (HCC): ICD-10-CM

## 2018-06-27 DIAGNOSIS — J45.30 MILD PERSISTENT ASTHMA WITHOUT COMPLICATION: Primary | ICD-10-CM

## 2018-06-27 DIAGNOSIS — Z99.89 OSA ON CPAP: ICD-10-CM

## 2018-06-27 LAB
25(OH)D3 SERPL-MCNC: 35.6 NG/ML (ref 30–100)
BUN SERPL-MCNC: 11 MG/DL (ref 5–25)
CREAT SERPL-MCNC: 0.61 MG/DL (ref 0.6–1.3)
GFR SERPL CREATININE-BSD FRML MDRD: 95 ML/MIN/1.73SQ M

## 2018-06-27 PROCEDURE — 36415 COLL VENOUS BLD VENIPUNCTURE: CPT

## 2018-06-27 PROCEDURE — 82306 VITAMIN D 25 HYDROXY: CPT

## 2018-06-27 PROCEDURE — 84520 ASSAY OF UREA NITROGEN: CPT

## 2018-06-27 PROCEDURE — 82565 ASSAY OF CREATININE: CPT

## 2018-06-27 PROCEDURE — 99214 OFFICE O/P EST MOD 30 MIN: CPT | Performed by: INTERNAL MEDICINE
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
## 2018-07-03 ENCOUNTER — HOSPITAL ENCOUNTER (OUTPATIENT)
Dept: MRI IMAGING | Facility: HOSPITAL | Age: 63
Discharge: HOME | End: 2018-07-03
Payer: COMMERCIAL

## 2018-07-03 DIAGNOSIS — C79.49 SECONDARY MALIGNANT NEOPLASM OF BRAIN AND SPINAL CORD (HCC): ICD-10-CM

## 2018-07-03 DIAGNOSIS — C79.31 SECONDARY MALIGNANT NEOPLASM OF BRAIN AND SPINAL CORD (HCC): ICD-10-CM

## 2018-07-03 DIAGNOSIS — G57.22 LESION OF LEFT FEMORAL NERVE: ICD-10-CM

## 2018-07-03 PROCEDURE — 72158 MRI LUMBAR SPINE W/O & W/DYE: CPT

## 2018-07-03 PROCEDURE — A9585 GADOBUTROL INJECTION: HCPCS | Performed by: RADIOLOGY

## 2018-07-03 PROCEDURE — 72197 MRI PELVIS W/O & W/DYE: CPT

## 2018-07-03 RX ADMIN — GADOBUTROL 4 ML: 604.72 INJECTION INTRAVENOUS at 15:56

## 2018-07-10 DIAGNOSIS — I10 BENIGN ESSENTIAL HYPERTENSION: Primary | ICD-10-CM

## 2018-07-10 RX ORDER — METOPROLOL SUCCINATE 25 MG/1
25 TABLET, EXTENDED RELEASE ORAL DAILY
Qty: 90 TABLET | Refills: 1 | Status: SHIPPED | OUTPATIENT
Start: 2018-07-10 | End: 2019-01-03 | Stop reason: SDUPTHER

## 2018-07-24 ENCOUNTER — OFFICE VISIT (OUTPATIENT)
Dept: HEMATOLOGY ONCOLOGY | Facility: CLINIC | Age: 63
End: 2018-07-24
Payer: COMMERCIAL

## 2018-07-24 VITALS
SYSTOLIC BLOOD PRESSURE: 120 MMHG | TEMPERATURE: 98.4 F | RESPIRATION RATE: 16 BRPM | WEIGHT: 98 LBS | DIASTOLIC BLOOD PRESSURE: 66 MMHG | HEIGHT: 63 IN | BODY MASS INDEX: 17.36 KG/M2 | HEART RATE: 89 BPM

## 2018-07-24 DIAGNOSIS — C54.1 ENDOMETRIAL CANCER (HCC): Primary | ICD-10-CM

## 2018-07-24 DIAGNOSIS — Z78.9 PATIENT IS JEHOVAH'S WITNESS: ICD-10-CM

## 2018-07-24 PROCEDURE — 99204 OFFICE O/P NEW MOD 45 MIN: CPT | Performed by: INTERNAL MEDICINE

## 2018-07-24 NOTE — PROGRESS NOTES
HEMATOLOGY / [address] [facility], [place] [facility] NOTE    Primary Care Provider: Kristan Tovar MD  Referring Provider: Pilar Valdez  MRN: [id number]  : 1955    Reason for Encounter:  Chief Complaint   Patient presents with    Consult     patient is here for an Oncology Second Opinion Consult          History of Hematology / Oncology Illness:     aLkhwinder Matias is a 70 y o  female who came in for 2nd opinion    Stage iv ,  Endometrial Cancer,      -  Diagnosed at [facility]      Interval History:     :  Patient is a 64-year-old female, with below mentioned hypertension asthma, presented with vaginal bleeding, further evaluation found having endometrial cancer, initially plan concurrent chemo RT, further imaging showed possible stage IV disease, patient came in today to establish 2nd Opinion  Pt came in reported at baseline health status  There is some weight loss  No new chest pain cough hemoptysis  No abdominal pain  She is never a smoker  Problem list:     Patient Active Problem List   Diagnosis    SEBASTIÁN on CPAP    Hypothyroidism    Mild persistent asthma    Benign essential hypertension    Hyperlipidemia    Hypersomnia    Osteoarthrosis    Osteopenia    Supraventricular tachycardia (HCC)    Hyperglycemia    Memory changes       Assessment / Plan:     1  Endometrial cancer [facility])  -  Patient is looking for 2nd opinion   -   At briefly reviewed chart,  We do not have a pathology report or imaging  Per the chart from [facility], a suggested stage IV endometrial cancer as PET scan showed diffuse metastatic lesion in the lung and diffuse intra-abdominal lymphadenopathy  I do not feel biopsies needed to establish stage IV  Cancer diagnosed  -    Made patient aware if indeed stage IV, this is not curable, standard care is chemotherapy, carbo Taxol plus minus bevacizumab  Briefly discussed with patient regarding chemotherapy the risks and benefit  Patient is scheduled to receive radiation therapy in PM C   She will again discussed with family friends and if she would like to switch care to [facility], she will call my office, in that situation I will refer her to GYN Oncology  2Bossman witness  60      minutes were spent face to face with patient with greater than 50% of the time spent in counseling or coordination of care including discussions of treatment instructions  All of the patient's questions were answered to their satisfactory during this discussion  Advised pt to call if there is any further questions  PHYSICIAL EXAMINATION:     Vital Signs:   [unfilled]  Body mass index is 17 36 kg/m²  Body surface area is 1 43 meters squared  GEN: Alert, awake oriented x3, in no acute distress  HEENT- No pallor, icterus, cyanosis, no oral mucosal lesions,   LAD - no palpable cervical, clavicle, axillary, inguinal LAD  Heart- normal S1 S2, regular rate and rhythm, No murmur, rubs  Lungs- decreased breathing sound bilateral    Abdomen- soft, Non tender, bowel sounds present  Extremities- No cyanosis, clubbing, edema  Neuro- No focal neurological deficit           PAST MEDICAL HISTORY:   has a past medical history of Allergic; Asthma; Cat bite; Chronic bronchitis ([address] ); Closed fracture of left zygomatic arch ([address] ); Closed fracture of multiple ribs of left side; Closed fracture of parietal bone of skull with routine healing; Closed fracture of temporal bone ([address] ); Closed nondisplaced fracture of left acromial process with routine healing; Disease of thyroid gland; Hypertension; Hypothyroidism; Palpitations; SAH (subarachnoid hemorrhage) ([address] ); Sleep apnea; Subarachnoid hemorrhage ([address] ); Subdural hematoma ([address] ); and Traumatic pneumothorax  PAST SURGICAL HISTORY:   has a past surgical history that includes ORIF facial fracture and No past surgeries      CURRENT MEDICATIONS:   Current Outpatient Prescriptions   Medication Sig Dispense Refill    acetaminophen (TYLENOL) 325 mg tablet Take 2 tablets by mouth every 6 (six) hours as needed for mild pain 30 tablet 0    albuterol (PROVENTIL HFA) 90 mcg/act inhaler Inhale 2 puffs every 6 (six) hours as needed      docusate sodium (COLACE) 100 mg capsule Take 1 capsule by mouth 2 (two) times a day 10 capsule 0    fluticasone (FLOVENT HFA) 44 mcg/act inhaler Inhale 2 puffs 2 (two) times a day 1 Inhaler 4    levothyroxine 75 mcg tablet TAKE 1 TABLET DAILY 90 tablet 3    metoprolol succinate (TOPROL-XL) 25 mg 24 hr tablet Take 1 tablet (25 mg total) by mouth daily 90 tablet 1    modafinil (PROVIGIL) 200 MG tablet Take 1 tablet (200 mg total) by mouth daily 90 tablet 3    OxyCODONE HCl 5 MG TABA Take 1 tablet by mouth every 4 (four) hours as needed      senna (SENOKOT) 8 6 mg Take 1 tablet by mouth daily at bedtime 30 each 0     No current facility-administered medications for this visit  [unfilled]    SOCIAL HISTORY:   reports that she has never smoked  She has never used smokeless tobacco  She reports that she drinks alcohol  She reports that she does not use drugs  FAMILY HISTORY:  family history includes Arthritis in her mother; Dementia in her mother; Diabetes in her father; Heart failure in her father; Rheum arthritis in her mother  ALLERGIES:  is allergic to aspirin and nsaids  REVIEW OF SYSTEMS:  Please note that a 14-point review of systems was performed to include Constitutional, HEENT, Respiratory, CVS, GI, , Musculoskeletal, Integumentary, Neurologic, Rheumatologic, Endocrinologic, Psychiatric, Lymphatic, and Hematologic/Oncologic systems were reviewed and are negative unless otherwise stated in HPI  Positive and negative findings pertinent to this evaluation are incorporated into the history of present illness              LAB:  Lab Results   Component Value Date    WBC 5 38 02/15/2018    HGB 14 3 02/15/2018    HCT 42 9 02/15/2018    MCV 95 02/15/2018     02/15/2018     Lab Results   Component Value Date     02/15/2018    K 3 6 02/15/2018     (H) 02/15/2018    CO2 25 02/15/2018    ANIONGAP 8 02/15/2018    BUN 11 06/27/2018    CREATININE 0 61 06/27/2018    GLUCOSE 82 09/04/2017    GLUF 106 (H) 02/15/2018    CALCIUM 8 8 02/15/2018    AST 12 02/15/2018    ALT 19 02/15/2018    ALKPHOS 84 02/15/2018    PROT 7 1 02/15/2018    BILITOT 1 08 (H) 02/15/2018    EGFR 95 06/27/2018       IMAGING:  No orders to display     Mri Lumbar Spine W Wo Contrast    Result Date: 7/5/2018  Narrative: MRI LUMBAR SPINE WITH AND WITHOUT CONTRAST INDICATION: C79 31: Secondary malignant neoplasm of brain C79 49: Secondary malignant neoplasm of other parts of nervous system G57 22: Lesion of femoral nerve, left lower limb COMPARISON:  None  TECHNIQUE:  Sagittal T1, sagittal T2, sagittal inversion recovery, axial T1 and axial T2, coronal T2  Sagittal and axial T1 postcontrast  IV Contrast:  4 mL of gadobutrol injection (MULTI-DOSE) IMAGE QUALITY:  Diagnostic FINDINGS: ALIGNMENT:  Minimal anterior spondylolisthesis of L4 upon L5  No spondylolysis  No compression fracture or scoliosis  MARROW SIGNAL:  Normal marrow signal is identified within the visualized bony structures  No discrete marrow lesion  DISTAL CORD AND CONUS:  Normal size and signal of the distal cord and conus  The conus ends at the L1 level  PARASPINAL SOFT TISSUES:  Heterogeneous appearance of the uterus suggesting leiomyomatous change  There is a partially visualized cystic mass identified within the pelvis to the left of midline likely ovarian/adnexal measuring 3 1 cm in AP diameter  Given patient's age, follow-up pelvic ultrasound at this time recommended  SACRUM:  Normal signal within the sacrum  No evidence of insufficiency or stress fracture  LOWER THORACIC DISC SPACES:  Normal disc height and signal   No disc herniation, canal stenosis or foraminal narrowing  LUMBAR DISC SPACES: L1-L2:  Normal  L2-L3:  Disc desiccation with slight loss of disc height    Slight annular bulging without significant canal stenosis  Minimal foraminal narrowing without nerve impingement  L3-L4:  Mild annular bulging  No discrete disc herniation, canal stenosis or foraminal narrowing  L4-L5:  Mild annular bulging with a very small central disc protrusion  Mild bilateral facet arthropathy with ligamentum flavum thickening  Moderate canal stenosis without foraminal narrowing  L5-S1:  No disc herniation, canal stenosis or foraminal narrowing  POSTCONTRAST IMAGING:  No abnormal enhancement  Impression: Mild lumbar degenerative change  Moderate canal stenosis at L4-5 secondary to annular bulging with small central disc protrusion and facet arthropathy with ligamentum flavum thickening  Only mild foraminal narrowing present  Partially visualized cystic mass within the left adnexal region likely ovarian  Pelvic MRI was also performed today  See separate pelvic MRI report  Heterogeneous appearance of the uterus suggesting leiomyomatous change  Workstation performed: [id number]     Mri Pelvis W Wo Contrast    Result Date: 7/5/2018  Narrative: MRI OF THE PELVIS WITH AND WITHOUT CONTRAST (GYNECOLOGIC) INDICATION:  History of uterine cancer, evaluate for metastasis COMPARISON: CT from May 26, 2017 TECHNIQUE: The following pulse sequences were obtained on a 1 5 T scanner: Axial, coronal and sagittal T2 with fat saturation, axial, coronal, sagittal T1, pre-contrast axial T1 with fat saturation, post-contrast axial and coronal T1 with fat saturation  IV Contrast:  4 mL of gadobutrol injection (MULTI-DOSE) FINDINGS: UTERUS: The endometrium is distended with enhancing soft tissue  This corresponds to the abnormal structure in the right adnexa seen on prior CT and is concerning for endometrial carcinoma  ADNEXA:  Right: Ovary normal in size and morphology  No adnexal mass identified  No evidence of hydrosalpinx  Left: Ovary normal in size and morphology    There is a 3 0 x 3 5 cm cysts in the left ovary, previously measuring 2 8 x 3 1 cm  There is either a small adjacent cyst or thin septation  There is no solid enhancement  No evidence of hydrosalpinx  BLADDER: Normal  PELVIC CAVITY:  No lymphadenopathy  BOWEL:  Unremarkable MRI appearance  OSSEOUS STRUCTURES:   No osseous destruction  VASCULAR STRUCTURES:  Visualized vasculature is normal  PELVIC WALL:  There is a 1 1 x 1 9 cm T2 hyperintense focus in the right inguinal region  This corresponds to a focus of fluid on the previous CT and appears unchanged though though there is peripheral enhancement seen on MRI  Impression: 1  Endometrial distention with enhancing soft tissue concerning for endometrial carcinoma  2   Fluid attenuation structure in the right inguinal region, similar to the prior CT though demonstrating peripheral enhancement on MRI  This is of uncertain etiology though metastasis can present with this appearance  Ultrasound evaluation for potential sampling is recommended  3   Slight interval increase in size of left adnexal cyst with possible within a vascular septation versus adjacent smaller cyst   No solid enhancement identified  Continued annual ultrasound surveillance is recommended  I personally discussed this study with DR PAYNE on 7/5/2018 at 12:33 AM  Workstation performed: [id number]

## 2018-07-31 ENCOUNTER — OFFICE VISIT (OUTPATIENT)
Dept: FAMILY MEDICINE CLINIC | Facility: CLINIC | Age: 63
End: 2018-07-31
Payer: COMMERCIAL

## 2018-07-31 VITALS
HEIGHT: 63 IN | SYSTOLIC BLOOD PRESSURE: 130 MMHG | HEART RATE: 104 BPM | OXYGEN SATURATION: 97 % | BODY MASS INDEX: 17.15 KG/M2 | WEIGHT: 96.8 LBS | TEMPERATURE: 98.5 F | DIASTOLIC BLOOD PRESSURE: 70 MMHG

## 2018-07-31 DIAGNOSIS — I10 BENIGN ESSENTIAL HYPERTENSION: ICD-10-CM

## 2018-07-31 DIAGNOSIS — R41.3 MEMORY CHANGES: ICD-10-CM

## 2018-07-31 DIAGNOSIS — R63.0 POOR APPETITE: ICD-10-CM

## 2018-07-31 DIAGNOSIS — C54.1 ENDOMETRIAL CANCER (HCC): Primary | ICD-10-CM

## 2018-07-31 PROCEDURE — 99214 OFFICE O/P EST MOD 30 MIN: CPT | Performed by: FAMILY MEDICINE

## 2018-07-31 PROCEDURE — 3075F SYST BP GE 130 - 139MM HG: CPT | Performed by: FAMILY MEDICINE

## 2018-07-31 PROCEDURE — 3078F DIAST BP <80 MM HG: CPT | Performed by: FAMILY MEDICINE

## 2018-07-31 NOTE — PROGRESS NOTES
Assessment/Plan:     Diagnoses and all orders for this visit:    Endometrial cancer ([address] )    Memory changes  -     b complex vitamins tablet; Take 1 tablet by mouth daily    Benign essential hypertension    Poor appetite        1  Endometrial ca - per oncology/hematology  2  Memory issues - will obtain neurology consult  3  HTN stable  FU 3 mo    Subjective:      Patient ID: Michelle Jovel is a 70 y o  female  She is here with her roommate/POA Henrique Gay  She was recently diagnosed with Stage IV endometrial cancer  She is treating through the [address] at [facility]  She recently started radiation and plans to start Chemotherapy soon  She has abdominal / back pain  Is taking oxycodone which helps  She does have a poor appetite  She is drinking boost shakes twice a day  She had seen Dr Rosalva Smith since our last OV for memory issues  She was advised to start B complex vitamin  She was advised not to drive at night because she will be prone to seizures due to previous trauma  We do not have the Neurology consult in her chart  Her blood pressure is well controlled on metoprolol  Denies chest pain shortness of breath or edema  The following portions of the patient's history were reviewed and updated as appropriate: She  has a past medical history of Allergic; Asthma; Cat bite; Chronic bronchitis ([address] ); Closed fracture of left zygomatic arch ([address] ); Closed fracture of multiple ribs of left side; Closed fracture of parietal bone of skull with routine healing; Closed fracture of temporal bone ([address] ); Closed nondisplaced fracture of left acromial process with routine healing; Disease of thyroid gland; Hypertension; Hypothyroidism; Palpitations; SAH (subarachnoid hemorrhage) ([address] ); Sleep apnea; Subarachnoid hemorrhage ([address] ); Subdural hematoma ([address] ); and Traumatic pneumothorax    She   Patient Active Problem List    Diagnosis Date Noted    Poor appetite 07/31/2018    Endometrial cancer ([facility] ) 06/19/2018    Memory changes 05/22/2018    Hyperglycemia 02/20/2018    Benign essential hypertension 12/14/2017    Supraventricular tachycardia ([facility] ) 11/17/2017    Hypothyroidism 08/29/2017    SEBASTIÁN on CPAP 08/28/2017    Mild persistent asthma 10/11/2016    Hyperlipidemia 10/11/2016    Hypersomnia 07/07/2015    Osteoarthrosis 06/09/2014    Osteopenia 06/09/2014     She  has a past surgical history that includes ORIF facial fracture and No past surgeries  Her family history includes Arthritis in her mother; Dementia in her mother; Diabetes in her father; Heart failure in her father; Rheum arthritis in her mother  She  reports that she has never smoked  She has never used smokeless tobacco  She reports that she drinks alcohol  She reports that she does not use drugs  Current Outpatient Prescriptions   Medication Sig Dispense Refill    acetaminophen (TYLENOL) 325 mg tablet Take 2 tablets by mouth every 6 (six) hours as needed for mild pain 30 tablet 0    albuterol (PROVENTIL HFA) 90 mcg/act inhaler Inhale 2 puffs every 6 (six) hours as needed      b complex vitamins tablet Take 1 tablet by mouth daily 100 tablet 0    docusate sodium (COLACE) 100 mg capsule Take 1 capsule by mouth 2 (two) times a day 10 capsule 0    fluticasone (FLOVENT HFA) 44 mcg/act inhaler Inhale 2 puffs 2 (two) times a day 1 Inhaler 4    levothyroxine 75 mcg tablet TAKE 1 TABLET DAILY 90 tablet 3    metoprolol succinate (TOPROL-XL) 25 mg 24 hr tablet Take 1 tablet (25 mg total) by mouth daily 90 tablet 1    modafinil (PROVIGIL) 200 MG tablet Take 1 tablet (200 mg total) by mouth daily 90 tablet 3    OxyCODONE HCl 5 MG TABA Take 1 tablet by mouth every 4 (four) hours as needed      senna (SENOKOT) 8 6 mg Take 1 tablet by mouth daily at bedtime 30 each 0     No current facility-administered medications for this visit        Current Outpatient Prescriptions on File Prior to Visit   Medication Sig    acetaminophen (TYLENOL) 325 mg tablet Take 2 tablets by mouth every 6 (six) hours as needed for mild pain    albuterol (PROVENTIL HFA) 90 mcg/act inhaler Inhale 2 puffs every 6 (six) hours as needed    docusate sodium (COLACE) 100 mg capsule Take 1 capsule by mouth 2 (two) times a day    fluticasone (FLOVENT HFA) 44 mcg/act inhaler Inhale 2 puffs 2 (two) times a day    levothyroxine 75 mcg tablet TAKE 1 TABLET DAILY    metoprolol succinate (TOPROL-XL) 25 mg 24 hr tablet Take 1 tablet (25 mg total) by mouth daily    modafinil (PROVIGIL) 200 MG tablet Take 1 tablet (200 mg total) by mouth daily    OxyCODONE HCl 5 MG TABA Take 1 tablet by mouth every 4 (four) hours as needed    senna (SENOKOT) 8 6 mg Take 1 tablet by mouth daily at bedtime     No current facility-administered medications on file prior to visit  She is allergic to aspirin and nsaids       Review of Systems   Constitutional: Negative for activity change  Respiratory: Negative for chest tightness and shortness of breath  Cardiovascular: Negative for chest pain and leg swelling  Gastrointestinal: Positive for abdominal pain  Poor appetite   Neurological: Negative for headaches  Psychiatric/Behavioral: Negative for dysphoric mood  The patient is not nervous/anxious  Objective:      /70   Pulse 104   Temp 98 5 °F (36 9 °C)   Ht 5' 3" (1 6 m)   Wt 43 9 kg (96 lb 12 8 oz)   SpO2 97%   BMI 17 15 kg/m²          Physical Exam   Constitutional: She is oriented to person, place, and time  Vital signs are normal  She is active  Thin    HENT:   Head: Normocephalic and atraumatic  Eyes: Conjunctivae are normal  Pupils are equal, round, and reactive to light  Neck: Neck supple  Cardiovascular: Normal rate, regular rhythm and normal heart sounds  Exam reveals no gallop and no friction rub  No murmur heard  Pulmonary/Chest: Effort normal and breath sounds normal  No respiratory distress  She has no wheezes  She has no rales  She exhibits no tenderness  Abdominal: Soft  She exhibits distension  Bowel sounds are decreased  There is no tenderness  Musculoskeletal: Normal range of motion  She exhibits no edema  Neurological: She is alert and oriented to person, place, and time  Skin: Skin is warm and dry  No rash noted  Psychiatric: She has a normal mood and affect  Her behavior is normal  Judgment and thought content normal    Nursing note and vitals reviewed

## 2018-08-27 ENCOUNTER — TELEPHONE (OUTPATIENT)
Dept: INTERNAL MEDICINE CLINIC | Facility: CLINIC | Age: 63
End: 2018-08-27

## 2018-09-11 DIAGNOSIS — G47.19 EXCESSIVE DAYTIME SLEEPINESS: ICD-10-CM

## 2018-09-11 DIAGNOSIS — G47.33 OSA ON CPAP: ICD-10-CM

## 2018-09-11 DIAGNOSIS — Z99.89 OSA ON CPAP: ICD-10-CM

## 2018-09-12 RX ORDER — MODAFINIL 200 MG/1
TABLET ORAL
Qty: 90 TABLET | Refills: 0 | Status: SHIPPED | OUTPATIENT
Start: 2018-09-12 | End: 2018-12-12 | Stop reason: SDUPTHER

## 2018-10-15 DIAGNOSIS — J45.30 MILD PERSISTENT ASTHMA WITHOUT COMPLICATION: ICD-10-CM

## 2018-10-15 RX ORDER — FLUTICASONE PROPIONATE 44 MCG
AEROSOL WITH ADAPTER (GRAM) INHALATION
Qty: 10.6 INHALER | Refills: 4 | Status: SHIPPED | OUTPATIENT
Start: 2018-10-15 | End: 2019-02-25 | Stop reason: SDUPTHER

## 2018-10-31 ENCOUNTER — OFFICE VISIT (OUTPATIENT)
Dept: FAMILY MEDICINE CLINIC | Facility: CLINIC | Age: 63
End: 2018-10-31
Payer: COMMERCIAL

## 2018-10-31 VITALS
RESPIRATION RATE: 16 BRPM | WEIGHT: 98 LBS | OXYGEN SATURATION: 97 % | TEMPERATURE: 98.9 F | BODY MASS INDEX: 17.36 KG/M2 | DIASTOLIC BLOOD PRESSURE: 78 MMHG | HEART RATE: 89 BPM | SYSTOLIC BLOOD PRESSURE: 122 MMHG | HEIGHT: 63 IN

## 2018-10-31 DIAGNOSIS — C54.1 ENDOMETRIAL CANCER (HCC): Primary | ICD-10-CM

## 2018-10-31 DIAGNOSIS — I10 BENIGN ESSENTIAL HYPERTENSION: ICD-10-CM

## 2018-10-31 DIAGNOSIS — R63.0 POOR APPETITE: ICD-10-CM

## 2018-10-31 PROCEDURE — 3078F DIAST BP <80 MM HG: CPT | Performed by: FAMILY MEDICINE

## 2018-10-31 PROCEDURE — 99214 OFFICE O/P EST MOD 30 MIN: CPT | Performed by: FAMILY MEDICINE

## 2018-10-31 PROCEDURE — 1036F TOBACCO NON-USER: CPT | Performed by: FAMILY MEDICINE

## 2018-10-31 PROCEDURE — 3008F BODY MASS INDEX DOCD: CPT | Performed by: FAMILY MEDICINE

## 2018-10-31 PROCEDURE — 3074F SYST BP LT 130 MM HG: CPT | Performed by: FAMILY MEDICINE

## 2018-10-31 NOTE — PROGRESS NOTES
Assessment/Plan:     Diagnoses and all orders for this visit:    Endometrial cancer ([address] )    Poor appetite    Benign essential hypertension        No changes  Continue medications  Continue follow-up with Oncology  Patient already had her flu shot  Subjective:      Patient ID: Tiffani Coto is a 70 y o  female  She is here for 3 month check up  H/O endometrial cancer - just started chemotherapy  She is being treated through [facility]  Her oncologist is Dr Elizabeth Clark  She completed radiation  Has some back / abdominal pain typically on the R side - is on oxycodone PRN  She gets this from her oncologist   Has only been using 2-3 per day  She is on a bowel regimen  Denies constipation  She states her appetite is pretty good  She is on metoprolol for her blood pressure  Her blood pressure is well controlled  The following portions of the patient's history were reviewed and updated as appropriate:   She  has a past medical history of Allergic; Asthma; Cat bite; Chronic bronchitis ([address] ); Closed fracture of left zygomatic arch ([address] ); Closed fracture of multiple ribs of left side; Closed fracture of parietal bone of skull with routine healing; Closed fracture of temporal bone ([address] ); Closed nondisplaced fracture of left acromial process with routine healing; Disease of thyroid gland; Hypertension; Hypothyroidism; Palpitations; SAH (subarachnoid hemorrhage) ([address] ); Sleep apnea; Subarachnoid hemorrhage ([address] ); Subdural hematoma ([address] ); and Traumatic pneumothorax    She   Patient Active Problem List    Diagnosis Date Noted    Poor appetite 07/31/2018    Endometrial cancer ([address] ) 06/19/2018    Memory changes 05/22/2018    Hyperglycemia 02/20/2018    Benign essential hypertension 12/14/2017    Supraventricular tachycardia ([address] ) 11/17/2017    Hypothyroidism 08/29/2017    SEBASTIÁN on CPAP 08/28/2017    Mild persistent asthma 10/11/2016    Hyperlipidemia 10/11/2016    Hypersomnia 07/07/2015    Osteoarthrosis 06/09/2014    Osteopenia 06/09/2014     She  has a past surgical history that includes ORIF facial fracture and No past surgeries  Her family history includes Arthritis in her mother; Dementia in her mother; Diabetes in her father; Heart failure in her father; Rheum arthritis in her mother  She  reports that she has never smoked  She has never used smokeless tobacco  She reports that she drinks alcohol  She reports that she does not use drugs  Current Outpatient Prescriptions   Medication Sig Dispense Refill    acetaminophen (TYLENOL) 325 mg tablet Take 2 tablets by mouth every 6 (six) hours as needed for mild pain 30 tablet 0    albuterol (PROVENTIL HFA) 90 mcg/act inhaler Inhale 2 puffs every 6 (six) hours as needed      b complex vitamins tablet Take 1 tablet by mouth daily 100 tablet 0    docusate sodium (COLACE) 100 mg capsule Take 1 capsule by mouth 2 (two) times a day 10 capsule 0    FLOVENT HFA 44 MCG/ACT inhaler TAKE 2 PUFFS BY MOUTH TWICE A DAY 10 6 Inhaler 4    levothyroxine 75 mcg tablet TAKE 1 TABLET DAILY 90 tablet 3    metoprolol succinate (TOPROL-XL) 25 mg 24 hr tablet Take 1 tablet (25 mg total) by mouth daily 90 tablet 1    modafinil (PROVIGIL) 200 MG tablet TAKE 1 TABLET EVERY DAY 90 tablet 0    OxyCODONE HCl 5 MG TABA Take 1 tablet by mouth every 4 (four) hours as needed      senna (SENOKOT) 8 6 mg Take 1 tablet by mouth daily at bedtime 30 each 0     No current facility-administered medications for this visit        Current Outpatient Prescriptions on File Prior to Visit   Medication Sig    acetaminophen (TYLENOL) 325 mg tablet Take 2 tablets by mouth every 6 (six) hours as needed for mild pain    albuterol (PROVENTIL HFA) 90 mcg/act inhaler Inhale 2 puffs every 6 (six) hours as needed    b complex vitamins tablet Take 1 tablet by mouth daily    docusate sodium (COLACE) 100 mg capsule Take 1 capsule by mouth 2 (two) times a day    FLOVENT HFA 44 MCG/ACT inhaler TAKE 2 PUFFS BY MOUTH TWICE A DAY    levothyroxine 75 mcg tablet TAKE 1 TABLET DAILY    metoprolol succinate (TOPROL-XL) 25 mg 24 hr tablet Take 1 tablet (25 mg total) by mouth daily    modafinil (PROVIGIL) 200 MG tablet TAKE 1 TABLET EVERY DAY    OxyCODONE HCl 5 MG TABA Take 1 tablet by mouth every 4 (four) hours as needed    senna (SENOKOT) 8 6 mg Take 1 tablet by mouth daily at bedtime     No current facility-administered medications on file prior to visit  She is allergic to aspirin and nsaids       Review of Systems   Constitutional: Negative for activity change  Respiratory: Negative for chest tightness and shortness of breath  Cardiovascular: Negative for chest pain and leg swelling  Neurological: Negative for headaches  Psychiatric/Behavioral: Negative for dysphoric mood  The patient is not nervous/anxious  Objective:      /78   Pulse 89   Temp 98 9 °F (37 2 °C) (Tympanic)   Resp 16   Ht 5' 3" (1 6 m)   Wt 44 5 kg (98 lb)   SpO2 97%   BMI 17 36 kg/m²          Physical Exam   Constitutional: She is oriented to person, place, and time  She appears cachectic  No distress  Thin appearing woman   HENT:   Head: Normocephalic and atraumatic  Eyes: Pupils are equal, round, and reactive to light  Conjunctivae are normal    Neck: Neck supple  Cardiovascular: Normal rate, regular rhythm and normal heart sounds  Exam reveals no gallop and no friction rub  No murmur heard  Pulmonary/Chest: Effort normal and breath sounds normal  No respiratory distress  She has no wheezes  She has no rales  She exhibits no tenderness  Musculoskeletal: Normal range of motion  She exhibits no edema  Neurological: She is alert and oriented to person, place, and time  Skin: Skin is warm and dry  No rash noted  She is not diaphoretic  Psychiatric: She has a normal mood and affect  Her behavior is normal  Judgment and thought content normal    Nursing note and vitals reviewed

## 2018-12-12 ENCOUNTER — OFFICE VISIT (OUTPATIENT)
Dept: PULMONOLOGY | Facility: CLINIC | Age: 63
End: 2018-12-12
Payer: COMMERCIAL

## 2018-12-12 VITALS
DIASTOLIC BLOOD PRESSURE: 60 MMHG | WEIGHT: 104 LBS | BODY MASS INDEX: 18.43 KG/M2 | HEIGHT: 63 IN | OXYGEN SATURATION: 94 % | SYSTOLIC BLOOD PRESSURE: 104 MMHG | HEART RATE: 80 BPM

## 2018-12-12 DIAGNOSIS — G47.19 EXCESSIVE DAYTIME SLEEPINESS: ICD-10-CM

## 2018-12-12 DIAGNOSIS — J45.30 MILD PERSISTENT ASTHMA WITHOUT COMPLICATION: Primary | ICD-10-CM

## 2018-12-12 DIAGNOSIS — G47.33 OSA ON CPAP: ICD-10-CM

## 2018-12-12 DIAGNOSIS — Z99.89 OSA ON CPAP: ICD-10-CM

## 2018-12-12 PROCEDURE — 4040F PNEUMOC VAC/ADMIN/RCVD: CPT | Performed by: INTERNAL MEDICINE

## 2018-12-12 PROCEDURE — 99214 OFFICE O/P EST MOD 30 MIN: CPT | Performed by: INTERNAL MEDICINE

## 2018-12-12 RX ORDER — MODAFINIL 200 MG/1
200 TABLET ORAL DAILY
Qty: 90 TABLET | Refills: 1 | Status: SHIPPED | OUTPATIENT
Start: 2018-12-12 | End: 2019-07-10 | Stop reason: SDUPTHER
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
## 2019-01-03 DIAGNOSIS — I10 BENIGN ESSENTIAL HYPERTENSION: ICD-10-CM

## 2019-01-03 RX ORDER — METOPROLOL SUCCINATE 25 MG/1
TABLET, EXTENDED RELEASE ORAL
Qty: 90 TABLET | Refills: 1 | Status: SHIPPED | OUTPATIENT
Start: 2019-01-03 | End: 2019-06-28 | Stop reason: SDUPTHER

## 2019-02-25 DIAGNOSIS — J45.30 MILD PERSISTENT ASTHMA WITHOUT COMPLICATION: ICD-10-CM

## 2019-02-25 RX ORDER — FLUTICASONE PROPIONATE 44 MCG
AEROSOL WITH ADAPTER (GRAM) INHALATION
Qty: 10.6 INHALER | Refills: 4 | Status: SHIPPED | OUTPATIENT
Start: 2019-02-25 | End: 2019-08-18 | Stop reason: SDUPTHER

## 2019-04-04 ENCOUNTER — OFFICE VISIT (OUTPATIENT)
Dept: FAMILY MEDICINE CLINIC | Facility: CLINIC | Age: 64
End: 2019-04-04
Payer: COMMERCIAL

## 2019-04-04 VITALS
HEART RATE: 98 BPM | TEMPERATURE: 98.9 F | OXYGEN SATURATION: 98 % | HEIGHT: 63 IN | DIASTOLIC BLOOD PRESSURE: 76 MMHG | WEIGHT: 105 LBS | SYSTOLIC BLOOD PRESSURE: 122 MMHG | BODY MASS INDEX: 18.61 KG/M2

## 2019-04-04 DIAGNOSIS — Z78.0 POSTMENOPAUSAL ESTROGEN DEFICIENCY: ICD-10-CM

## 2019-04-04 DIAGNOSIS — Z11.59 NEED FOR HEPATITIS C SCREENING TEST: ICD-10-CM

## 2019-04-04 DIAGNOSIS — I10 BENIGN ESSENTIAL HYPERTENSION: ICD-10-CM

## 2019-04-04 DIAGNOSIS — C54.1 ENDOMETRIAL CANCER (HCC): Primary | ICD-10-CM

## 2019-04-04 DIAGNOSIS — E03.9 ACQUIRED HYPOTHYROIDISM: ICD-10-CM

## 2019-04-04 DIAGNOSIS — R09.89 BRUIT OF LEFT CAROTID ARTERY: ICD-10-CM

## 2019-04-04 DIAGNOSIS — Z00.00 MEDICARE ANNUAL WELLNESS VISIT, INITIAL: ICD-10-CM

## 2019-04-04 DIAGNOSIS — Z12.31 ENCOUNTER FOR SCREENING MAMMOGRAM FOR BREAST CANCER: ICD-10-CM

## 2019-04-04 DIAGNOSIS — J45.30 MILD PERSISTENT ASTHMA WITHOUT COMPLICATION: ICD-10-CM

## 2019-04-04 DIAGNOSIS — R73.9 HYPERGLYCEMIA: ICD-10-CM

## 2019-04-04 PROCEDURE — 1170F FXNL STATUS ASSESSED: CPT | Performed by: FAMILY MEDICINE

## 2019-04-04 PROCEDURE — 3078F DIAST BP <80 MM HG: CPT | Performed by: FAMILY MEDICINE

## 2019-04-04 PROCEDURE — 1036F TOBACCO NON-USER: CPT | Performed by: FAMILY MEDICINE

## 2019-04-04 PROCEDURE — 99214 OFFICE O/P EST MOD 30 MIN: CPT | Performed by: FAMILY MEDICINE

## 2019-04-04 PROCEDURE — 3725F SCREEN DEPRESSION PERFORMED: CPT | Performed by: FAMILY MEDICINE

## 2019-04-04 PROCEDURE — 3074F SYST BP LT 130 MM HG: CPT | Performed by: FAMILY MEDICINE

## 2019-04-04 PROCEDURE — 1160F RVW MEDS BY RX/DR IN RCRD: CPT | Performed by: FAMILY MEDICINE

## 2019-04-04 PROCEDURE — 1125F AMNT PAIN NOTED PAIN PRSNT: CPT | Performed by: FAMILY MEDICINE

## 2019-04-04 PROCEDURE — 1101F PT FALLS ASSESS-DOCD LE1/YR: CPT | Performed by: FAMILY MEDICINE

## 2019-04-04 PROCEDURE — 3008F BODY MASS INDEX DOCD: CPT | Performed by: FAMILY MEDICINE

## 2019-04-04 PROCEDURE — G0438 PPPS, INITIAL VISIT: HCPCS | Performed by: FAMILY MEDICINE

## 2019-04-04 RX ORDER — ONDANSETRON 4 MG/1
6 TABLET, ORALLY DISINTEGRATING ORAL EVERY 6 HOURS PRN
COMMUNITY

## 2019-04-04 RX ORDER — LETROZOLE 2.5 MG/1
2.5 TABLET, FILM COATED ORAL DAILY
COMMUNITY

## 2019-04-04 RX ORDER — FLUTICASONE PROPIONATE 44 UG/1
2 AEROSOL, METERED RESPIRATORY (INHALATION) 2 TIMES DAILY
COMMUNITY
End: 2019-07-10 | Stop reason: SDUPTHER

## 2019-04-04 RX ORDER — MULTIVIT-MIN/IRON/FOLIC ACID/K 18-600-40
CAPSULE ORAL
COMMUNITY

## 2019-04-04 RX ORDER — MULTIVIT WITH MINERALS/LUTEIN
1000 TABLET ORAL DAILY
COMMUNITY

## 2019-04-04 RX ORDER — CETIRIZINE HYDROCHLORIDE 10 MG/1
10 TABLET ORAL DAILY
COMMUNITY

## 2019-04-04 RX ORDER — POLYETHYLENE GLYCOL 3350 17 G/17G
17 POWDER, FOR SOLUTION ORAL DAILY
COMMUNITY

## 2019-04-04 RX ORDER — OMEGA-3-ACID ETHYL ESTERS 1 G/1
2 CAPSULE, LIQUID FILLED ORAL 2 TIMES DAILY
COMMUNITY

## 2019-04-04 RX ORDER — PROCHLORPERAZINE MALEATE 5 MG/1
5 TABLET ORAL EVERY 6 HOURS PRN
COMMUNITY

## 2019-05-05 DIAGNOSIS — E03.9 ACQUIRED HYPOTHYROIDISM: ICD-10-CM

## 2019-05-06 LAB
HBA1C MFR BLD HPLC: 5.5 %
HCV AB SER-ACNC: NEGATIVE

## 2019-05-06 RX ORDER — LEVOTHYROXINE SODIUM 0.07 MG/1
TABLET ORAL
Qty: 90 TABLET | Refills: 3 | Status: SHIPPED | OUTPATIENT
Start: 2019-05-06

## 2019-05-30 DIAGNOSIS — Z12.31 ENCOUNTER FOR SCREENING MAMMOGRAM FOR BREAST CANCER: ICD-10-CM

## 2019-05-31 ENCOUNTER — HOSPITAL ENCOUNTER (OUTPATIENT)
Dept: ULTRASOUND IMAGING | Facility: HOSPITAL | Age: 64
Discharge: HOME | End: 2019-05-31
Payer: COMMERCIAL

## 2019-05-31 DIAGNOSIS — R09.89 BRUIT OF LEFT CAROTID ARTERY: ICD-10-CM

## 2019-05-31 PROCEDURE — 93880 EXTRACRANIAL BILAT STUDY: CPT | Performed by: SURGERY

## 2019-05-31 PROCEDURE — 93880 EXTRACRANIAL BILAT STUDY: CPT

## 2019-06-03 DIAGNOSIS — I77.9 LEFT-SIDED CAROTID ARTERY DISEASE, UNSPECIFIED TYPE (HCC): Primary | ICD-10-CM

## 2019-06-19 DIAGNOSIS — Z78.0 POSTMENOPAUSAL ESTROGEN DEFICIENCY: ICD-10-CM

## 2019-06-28 DIAGNOSIS — I10 BENIGN ESSENTIAL HYPERTENSION: ICD-10-CM

## 2019-06-28 RX ORDER — METOPROLOL SUCCINATE 25 MG/1
TABLET, EXTENDED RELEASE ORAL
Qty: 90 TABLET | Refills: 1 | Status: SHIPPED | OUTPATIENT
Start: 2019-06-28 | End: 2019-12-22 | Stop reason: SDUPTHER

## 2019-07-10 ENCOUNTER — OFFICE VISIT (OUTPATIENT)
Dept: PULMONOLOGY | Facility: CLINIC | Age: 64
End: 2019-07-10
Payer: COMMERCIAL

## 2019-07-10 VITALS
BODY MASS INDEX: 17.89 KG/M2 | WEIGHT: 101 LBS | HEIGHT: 63 IN | OXYGEN SATURATION: 96 % | SYSTOLIC BLOOD PRESSURE: 140 MMHG | HEART RATE: 98 BPM | DIASTOLIC BLOOD PRESSURE: 70 MMHG

## 2019-07-10 DIAGNOSIS — Z99.89 OSA ON CPAP: ICD-10-CM

## 2019-07-10 DIAGNOSIS — G47.19 EXCESSIVE DAYTIME SLEEPINESS: ICD-10-CM

## 2019-07-10 DIAGNOSIS — J45.20 MILD INTERMITTENT ASTHMA, UNSPECIFIED WHETHER COMPLICATED: Primary | ICD-10-CM

## 2019-07-10 DIAGNOSIS — G47.33 OSA ON CPAP: ICD-10-CM

## 2019-07-10 DIAGNOSIS — C54.1: ICD-10-CM

## 2019-07-10 DIAGNOSIS — C79.9 METASTATIC DISEASE (HCC): ICD-10-CM

## 2019-07-10 DIAGNOSIS — R91.8 PULMONARY NODULES: ICD-10-CM

## 2019-07-10 PROCEDURE — 99214 OFFICE O/P EST MOD 30 MIN: CPT | Performed by: PHYSICIAN ASSISTANT

## 2019-07-10 RX ORDER — MODAFINIL 200 MG/1
200 TABLET ORAL DAILY
Qty: 90 TABLET | Refills: 1 | Status: SHIPPED | OUTPATIENT
Start: 2019-07-10
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
## 2019-07-10 NOTE — PATIENT INSTRUCTIONS
Follow-up for your next appointment in approximately 6 months  Please do not hesitate to call the office prior to your next appointment should you have any questions or concerns  Worrisome symptoms that should prompt a call to 911 or visit to the ER include chest pain, severe shortness of breath, cyanosis (blue discoloration of the skin), dizziness/light-headedness, passing out  Continue to follow up with your Primary Care Provider and any other specialists you see

## 2019-08-18 DIAGNOSIS — J45.30 MILD PERSISTENT ASTHMA WITHOUT COMPLICATION: ICD-10-CM

## 2019-08-18 RX ORDER — FLUTICASONE PROPIONATE 44 MCG
AEROSOL WITH ADAPTER (GRAM) INHALATION
Qty: 10.6 INHALER | Refills: 4 | Status: SHIPPED | OUTPATIENT
Start: 2019-08-18

## 2019-10-30 ENCOUNTER — TELEPHONE (OUTPATIENT)
Dept: FAMILY MEDICINE CLINIC | Facility: CLINIC | Age: 64
End: 2019-10-30

## 2019-10-30 NOTE — TELEPHONE ENCOUNTER
TCM: scheduled with Dr Royer Raphael for 11/8/19     D/c 11/2/19 from [facility]    Adm: 10/13/19- heart failure    # [phone] x- 15 ( April)

## 2019-11-05 ENCOUNTER — TRANSITIONAL CARE MANAGEMENT (OUTPATIENT)
Dept: FAMILY MEDICINE CLINIC | Facility: CLINIC | Age: 64
End: 2019-11-05

## 2019-12-22 DIAGNOSIS — I10 BENIGN ESSENTIAL HYPERTENSION: ICD-10-CM

## 2019-12-22 RX ORDER — METOPROLOL SUCCINATE 25 MG/1
TABLET, EXTENDED RELEASE ORAL
Qty: 90 TABLET | Refills: 1 | Status: SHIPPED | OUTPATIENT
Start: 2019-12-22

## 2021-11-15 NOTE — PROGRESS NOTES
Assessment    1  Supraventricular tachycardia (427 89) (I47 1)   2  Multiple rib fractures involving four or more ribs (807 09) (S22 49XA)    Plan  Multiple rib fractures involving four or more ribs    · * XR RIBS LEFT W PA CHEST MIN 3 VIEWS; Status:Active; Requested for:17Nov2017;   Palpitations    · EKG/ECG- POC; Status:Complete;   Done: [id number] 12:00AM  Supraventricular tachycardia    · Metoprolol Tartrate 25 MG Oral Tablet; take 0 5 tablet daily    Discussion/Summary    Tachycardia -patient goes for physical therapy for her rib fractures and while she is doing exercises, her heart rate goes up to more than 110 per minute  Patient remains asymptomatic most of the time  I will start her on 12 5 milligrams metoprolol  The patient was counseled regarding instructions for management,-- risks and benefits of treatment options,-- importance of compliance with treatment  Possible side effects of new medications were reviewed with the patient/guardian today  The treatment plan was reviewed with the patient/guardian  The patient/guardian understands and agrees with the treatment plan      Chief Complaint  Palpitations      History of Present Illness  Supraventricular Tachycardia (Brief): The patient is being seen for worsening symptoms of supraventricular tachycardia  Symptoms:  palpitations  The patient is currently experiencing symptoms  No associated symptoms are reported  The patient is not currently being treated for this problem  Review of Systems   Constitutional: No fever, no chills, feels well, no tiredness, no recent weight gain or loss  ENT: no ear ache, no loss of hearing, no nosebleeds or nasal discharge, no sore throat or hoarseness  Cardiovascular: as noted in HPI  Respiratory: no complaints of shortness of breath, no wheezing, no dyspnea on exertion, no orthopnea or PND  Breasts: no complaints of breast pain, breast lump or nipple discharge    Gastrointestinal: no complaints of abdominal Visit Vitals  /70   Pulse (!) 44   Resp 16   Ht 5' 3\" (1.6 m)   Wt 93 lb (42.2 kg)   SpO2 100%   BMI 16.47 kg/m² pain, no constipation, no nausea or diarrhea, no vomiting, no bloody stools  Genitourinary: no complaints of dysuria, no incontinence, no pelvic pain, no dysmenorrhea, no vaginal discharge or abnormal vaginal bleeding  Musculoskeletal: no complaints of arthralgia, no myalgia, no joint swelling or stiffness, no limb pain or swelling  Integumentary: no complaints of skin rash or lesion, no itching or dry skin, no skin wounds  Neurological: no complaints of headache, no confusion, no numbness or tingling, no dizziness or fainting  Active Problems    1  Ambulatory dysfunction (719 7) (R26 2)   2  Asthma (493 90) (J45 909)   3  Closed fracture of left zygomatic arch, initial encounter (802 4) (S02 40FA)   4  Closed fracture of multiple ribs of left side, initial encounter (807 09) (S22 42XA)   5  Closed fracture of parietal bone of skull with routine healing, subsequent encounter (V54 19) (S02  0XXD)   6  Closed fracture of temporal bone, initial encounter (801 00) (S02 19XA)   7  Closed nondisplaced fracture of left acromial process with routine healing (V54 11) (S42 125D)   8  Dry scalp (701 1) (R23 8)   9  History of subarachnoid hemorrhage (V12 59) (Z86 79)   10  History of subdural hematoma (post traumatic) (V12 59) (Z87 828)   11  Hyperlipidemia (272 4) (E78 5)   12  Hypothyroidism (244 9) (E03 9)   13  Mental and behavioral problems with communication (including speech) (V40 1) (F80 9)   14  Mild persistent asthma (493 90) (J45 30)   15  Multiple rib fractures involving four or more ribs (807 09) (S22 49XA)   16  Need for influenza vaccination (V04 81) (Z23)   17  SEBASTIÁN (obstructive sleep apnea) (327 23) (G47 33)   18  SEBASTIÁN on CPAP (327 23,V46 8) (G47 33,Z99 89)   19  Osteoarthrosis (715 90) (M19 90)   20  Osteopenia (733 90) (M85 80)   21  Palpitations (785 1) (R00 2)   22  Physical deconditioning (799 3) (R53 81)   23  SAH (subarachnoid hemorrhage) (430) (I60 9)   24  Sleep apnea (173 57) (G47 30)   25  Subdural hematoma (432 1) (I62 00)    Past Medical History  Active Problems And Past Medical History Reviewed: The active problems and past medical history were reviewed and updated today  Social History     · Never a smoker   · No drug use   · Retired   · Retired critical care nurse   · Skilled nursing facility   · Social alcohol use (Z78 9)  The social history was reviewed and updated today  The social history was reviewed and is unchanged  Current Meds   1  Acetaminophen 325 MG Oral Tablet; TAKE 1 TO 2 TABLETS EVERY 6 HOURS AS NEEDED; Therapy: (Recorded:90Crb7772) to Recorded   2  Colace 100 MG Oral Capsule; TAKE 1 CAPSULE TWICE DAILY AS NEEDED; Therapy: (Recorded:16Kzw2538) to Recorded   3  Flovent HFA 44 MCG/ACT Inhalation Aerosol; USE 2 INHALATIONS TWICE A DAY; Therapy: 23Apr2014 to (Eden Jin)  Requested for: 77QSE6004; Last Rx:79Tew4593 Ordered   4  Levothyroxine Sodium 75 MCG Oral Tablet; Take 1 tablet daily; Therapy: 80QRN2699 to (Last Rx:57Rbm6077)  Requested for: 37Gxt7775 Ordered   5  Methocarbamol 500 MG Oral Tablet; TAKE 0 5 TABLET Every 6 hours  Requested for: 23KCN1417; Last Rx:56Dnk5484 Ordered   6  Modafinil 200 MG Oral Tablet; TAKE 1 TABLET DAILY; Therapy: 23Maz6370 to (Evaluate:11Nov2017); Last Rx:83Sji7663 Ordered   7  OxyCODONE HCl - 5 MG Oral Tablet; TAKE 1 TABLET EVERY 4 HOURS AS NEEDED FOR PAIN; Therapy: (Recorded:39Jzm0755) to Recorded   8  Proventil  (90 Base) MCG/ACT Inhalation Aerosol Solution; INHALE 2 PUFFS Every 6 hours PRN; Therapy: 82TAR9756 to (Renew:20Apr2018)  Requested for: 25Apr2017; Last Rx:25Apr2017 Ordered   9  Senokot 8 6 MG Oral Tablet; Take 1 tablet daily; Therapy: (Recorded:32Exl3491) to Recorded   10  TraMADol HCl - 50 MG Oral Tablet; Therapy: (Recorded:61Vgv4576) to Recorded    The medication list was reviewed and updated today  Allergies  1   No Known Drug Allergies    Vitals   Recorded: 95DYK5883 04:23PM   Heart Rate 848   Systolic 958 Diastolic 82   Height 5 ft 2 in   Weight 111 lb 8 oz   BMI Calculated 20 39   BSA Calculated 1 49   O2 Saturation 97       Physical Exam   Constitutional  General appearance: No acute distress, well appearing and well nourished  Eyes  Conjunctiva and lids: No swelling, erythema or discharge  Pupils and irises: Equal, round and reactive to light  Ears, Nose, Mouth, and Throat  External inspection of ears and nose: Normal    Otoscopic examination: Tympanic membranes translucent with normal light reflex  Canals patent without erythema  Oropharynx: Normal with no erythema, edema, exudate or lesions  Pulmonary  Respiratory effort: No increased work of breathing or signs of respiratory distress  Auscultation of lungs: Clear to auscultation  Cardiovascular  Palpation of heart: Normal PMI, no thrills  Auscultation of heart: Abnormal   The heart rate was tachycardic  Examination of extremities for edema and/or varicosities: Normal    Abdomen  Abdomen: Non-tender, no masses  Lymphatic  Palpation of lymph nodes in neck: No lymphadenopathy  Musculoskeletal  Gait and station: Normal    Skin  Skin and subcutaneous tissue: Normal without rashes or lesions  Neurologic  Cranial nerves: Cranial nerves 2-12 intact  Psychiatric  Orientation to person, place, and time: Normal    Mood and affect: Normal          Results/Data  EKG/ECG- POC [id number] 12:00AM Veena Salinas     Test Name Result Flag Reference   EKG/ECG 11/17/2017         Future Appointments    Date/Time Provider Specialty Site   12/14/2017 11:15 AM OLAMIDE Linton  Pulmonary Medicine [facility]   12/14/2017 01:30 PM [facility] Lowe  [address] [facility]   02/20/2018 01:15 PM OLAMIDE Lowe   Internal Medicine [facility] OF [facility]       Signatures   Electronically signed by : OLAMIDE Freeman ; Nov 17 2017  5:52PM EST                       (Author)